# Patient Record
Sex: MALE | Race: WHITE | NOT HISPANIC OR LATINO | Employment: FULL TIME | RURAL
[De-identification: names, ages, dates, MRNs, and addresses within clinical notes are randomized per-mention and may not be internally consistent; named-entity substitution may affect disease eponyms.]

---

## 2021-04-02 RX ORDER — LISINOPRIL 10 MG/1
10 TABLET ORAL DAILY
COMMUNITY
Start: 2021-03-08 | End: 2021-04-02 | Stop reason: SDUPTHER

## 2021-04-02 RX ORDER — LISINOPRIL 10 MG/1
10 TABLET ORAL DAILY
Qty: 90 TABLET | Refills: 1 | Status: SHIPPED | OUTPATIENT
Start: 2021-04-02 | End: 2021-11-09 | Stop reason: DRUGHIGH

## 2021-04-02 RX ORDER — AMLODIPINE BESYLATE 5 MG/1
5 TABLET ORAL DAILY
Qty: 90 TABLET | Refills: 1 | Status: SHIPPED | OUTPATIENT
Start: 2021-04-02 | End: 2021-08-25

## 2021-04-02 RX ORDER — AMLODIPINE BESYLATE 5 MG/1
5 TABLET ORAL DAILY
COMMUNITY
Start: 2021-03-08 | End: 2021-04-02 | Stop reason: SDUPTHER

## 2021-04-13 ENCOUNTER — OFFICE VISIT (OUTPATIENT)
Dept: FAMILY MEDICINE | Facility: CLINIC | Age: 61
End: 2021-04-13
Payer: COMMERCIAL

## 2021-04-13 VITALS
HEART RATE: 62 BPM | RESPIRATION RATE: 20 BRPM | DIASTOLIC BLOOD PRESSURE: 84 MMHG | BODY MASS INDEX: 33 KG/M2 | OXYGEN SATURATION: 97 % | SYSTOLIC BLOOD PRESSURE: 126 MMHG | WEIGHT: 243.63 LBS | TEMPERATURE: 98 F | HEIGHT: 72 IN

## 2021-04-13 DIAGNOSIS — E78.00 HYPERCHOLESTEREMIA: ICD-10-CM

## 2021-04-13 DIAGNOSIS — M25.521 BILATERAL ELBOW JOINT PAIN: ICD-10-CM

## 2021-04-13 DIAGNOSIS — M25.522 BILATERAL ELBOW JOINT PAIN: ICD-10-CM

## 2021-04-13 DIAGNOSIS — I10 ESSENTIAL HYPERTENSION: Primary | ICD-10-CM

## 2021-04-13 DIAGNOSIS — M25.562 CHRONIC PAIN OF LEFT KNEE: ICD-10-CM

## 2021-04-13 DIAGNOSIS — G89.29 CHRONIC PAIN OF LEFT KNEE: ICD-10-CM

## 2021-04-13 DIAGNOSIS — Z78.9 DIPHTHERIA-PERTUSSIS-TETANUS (DPT) VACCINATION ADMINISTERED AT CURRENT VISIT: ICD-10-CM

## 2021-04-13 PROCEDURE — 90471 PR IMMUNIZ ADMIN,1 SINGLE/COMB VAC/TOXOID: ICD-10-PCS | Mod: ,,, | Performed by: NURSE PRACTITIONER

## 2021-04-13 PROCEDURE — 90715 PR TDAP VACCINE >7 YO, IM: ICD-10-PCS | Mod: ,,, | Performed by: NURSE PRACTITIONER

## 2021-04-13 PROCEDURE — 99214 OFFICE O/P EST MOD 30 MIN: CPT | Mod: 25,,, | Performed by: NURSE PRACTITIONER

## 2021-04-13 PROCEDURE — 90471 IMMUNIZATION ADMIN: CPT | Mod: ,,, | Performed by: NURSE PRACTITIONER

## 2021-04-13 PROCEDURE — 99214 PR OFFICE/OUTPT VISIT, EST, LEVL IV, 30-39 MIN: ICD-10-PCS | Mod: 25,,, | Performed by: NURSE PRACTITIONER

## 2021-04-13 PROCEDURE — 90715 TDAP VACCINE 7 YRS/> IM: CPT | Mod: ,,, | Performed by: NURSE PRACTITIONER

## 2021-04-13 RX ORDER — MELOXICAM 7.5 MG/1
7.5 TABLET ORAL DAILY
Qty: 90 TABLET | Refills: 1 | Status: CANCELLED | OUTPATIENT
Start: 2021-04-13

## 2021-04-13 RX ORDER — MELOXICAM 7.5 MG/1
7.5 TABLET ORAL DAILY
COMMUNITY
End: 2021-04-13 | Stop reason: SDUPTHER

## 2021-04-13 RX ORDER — METOPROLOL SUCCINATE 25 MG/1
25 TABLET, EXTENDED RELEASE ORAL DAILY
COMMUNITY
Start: 2021-04-08 | End: 2021-11-09

## 2021-04-13 RX ORDER — MELOXICAM 7.5 MG/1
7.5 TABLET ORAL DAILY
Qty: 90 TABLET | Refills: 1 | Status: SHIPPED | OUTPATIENT
Start: 2021-04-13 | End: 2021-10-12

## 2021-11-09 ENCOUNTER — OFFICE VISIT (OUTPATIENT)
Dept: FAMILY MEDICINE | Facility: CLINIC | Age: 61
End: 2021-11-09
Payer: COMMERCIAL

## 2021-11-09 VITALS
DIASTOLIC BLOOD PRESSURE: 84 MMHG | HEART RATE: 61 BPM | WEIGHT: 246 LBS | BODY MASS INDEX: 33.32 KG/M2 | SYSTOLIC BLOOD PRESSURE: 126 MMHG | RESPIRATION RATE: 20 BRPM | OXYGEN SATURATION: 96 % | HEIGHT: 72 IN | TEMPERATURE: 98 F

## 2021-11-09 DIAGNOSIS — Z11.59 NEED FOR HEPATITIS C SCREENING TEST: ICD-10-CM

## 2021-11-09 DIAGNOSIS — Z86.16 HISTORY OF COVID-19: ICD-10-CM

## 2021-11-09 DIAGNOSIS — Z79.899 ENCOUNTER FOR LONG-TERM (CURRENT) USE OF OTHER MEDICATIONS: ICD-10-CM

## 2021-11-09 DIAGNOSIS — Z13.1 DIABETES MELLITUS SCREENING: ICD-10-CM

## 2021-11-09 DIAGNOSIS — Z23 FLU VACCINE NEED: ICD-10-CM

## 2021-11-09 DIAGNOSIS — Z00.00 ROUTINE GENERAL MEDICAL EXAMINATION AT A HEALTH CARE FACILITY: Primary | ICD-10-CM

## 2021-11-09 DIAGNOSIS — Z12.5 PROSTATE CANCER SCREENING: ICD-10-CM

## 2021-11-09 DIAGNOSIS — Z13.220 SCREENING FOR HYPERLIPIDEMIA: ICD-10-CM

## 2021-11-09 LAB
CHOLEST SERPL-MCNC: 86 MG/DL (ref 0–200)
CHOLEST/HDLC SERPL: 1.9 {RATIO}
GLUCOSE SERPL-MCNC: 102 MG/DL (ref 74–106)
HCV AB SER QL: NORMAL
HDLC SERPL-MCNC: 46 MG/DL (ref 40–60)
LDLC SERPL CALC-MCNC: 26 MG/DL
LDLC/HDLC SERPL: 0.6 {RATIO}
NONHDLC SERPL-MCNC: 40 MG/DL
PSA SERPL-MCNC: 1.29 NG/ML (ref 0–4.1)
TRIGL SERPL-MCNC: 70 MG/DL (ref 35–150)
VLDLC SERPL-MCNC: 14 MG/DL

## 2021-11-09 PROCEDURE — 82947 GLUCOSE, FASTING: ICD-10-PCS | Mod: ,,, | Performed by: CLINICAL MEDICAL LABORATORY

## 2021-11-09 PROCEDURE — 86769 SARS-COV-2 SPIKE AB, SEMI-QUANT, S: ICD-10-PCS | Mod: 90,,, | Performed by: CLINICAL MEDICAL LABORATORY

## 2021-11-09 PROCEDURE — 90686 IIV4 VACC NO PRSV 0.5 ML IM: CPT | Mod: ,,, | Performed by: NURSE PRACTITIONER

## 2021-11-09 PROCEDURE — G0103 PSA SCREENING: HCPCS | Mod: ,,, | Performed by: CLINICAL MEDICAL LABORATORY

## 2021-11-09 PROCEDURE — G0103 PSA, SCREENING: ICD-10-PCS | Mod: ,,, | Performed by: CLINICAL MEDICAL LABORATORY

## 2021-11-09 PROCEDURE — 80061 LIPID PANEL: CPT | Mod: ,,, | Performed by: CLINICAL MEDICAL LABORATORY

## 2021-11-09 PROCEDURE — 80061 LIPID PANEL: ICD-10-PCS | Mod: ,,, | Performed by: CLINICAL MEDICAL LABORATORY

## 2021-11-09 PROCEDURE — 82947 ASSAY GLUCOSE BLOOD QUANT: CPT | Mod: ,,, | Performed by: CLINICAL MEDICAL LABORATORY

## 2021-11-09 PROCEDURE — 90686 FLU VACCINE (QUAD) GREATER THAN OR EQUAL TO 3YO PRESERVATIVE FREE IM: ICD-10-PCS | Mod: ,,, | Performed by: NURSE PRACTITIONER

## 2021-11-09 PROCEDURE — 3079F DIAST BP 80-89 MM HG: CPT | Mod: ,,, | Performed by: NURSE PRACTITIONER

## 2021-11-09 PROCEDURE — 86803 HEPATITIS C AB TEST: CPT | Mod: ,,, | Performed by: CLINICAL MEDICAL LABORATORY

## 2021-11-09 PROCEDURE — 99396 PREV VISIT EST AGE 40-64: CPT | Mod: 25,,, | Performed by: NURSE PRACTITIONER

## 2021-11-09 PROCEDURE — 3079F PR MOST RECENT DIASTOLIC BLOOD PRESSURE 80-89 MM HG: ICD-10-PCS | Mod: ,,, | Performed by: NURSE PRACTITIONER

## 2021-11-09 PROCEDURE — 3008F PR BODY MASS INDEX (BMI) DOCUMENTED: ICD-10-PCS | Mod: ,,, | Performed by: NURSE PRACTITIONER

## 2021-11-09 PROCEDURE — 99396 PR PREVENTIVE VISIT,EST,40-64: ICD-10-PCS | Mod: 25,,, | Performed by: NURSE PRACTITIONER

## 2021-11-09 PROCEDURE — 4010F PR ACE/ARB THEARPY RXD/TAKEN: ICD-10-PCS | Mod: ,,, | Performed by: NURSE PRACTITIONER

## 2021-11-09 PROCEDURE — 86803 HEPATITIS C ANTIBODY: ICD-10-PCS | Mod: ,,, | Performed by: CLINICAL MEDICAL LABORATORY

## 2021-11-09 PROCEDURE — 1159F PR MEDICATION LIST DOCUMENTED IN MEDICAL RECORD: ICD-10-PCS | Mod: ,,, | Performed by: NURSE PRACTITIONER

## 2021-11-09 PROCEDURE — 90471 FLU VACCINE (QUAD) GREATER THAN OR EQUAL TO 3YO PRESERVATIVE FREE IM: ICD-10-PCS | Mod: ,,, | Performed by: NURSE PRACTITIONER

## 2021-11-09 PROCEDURE — 4010F ACE/ARB THERAPY RXD/TAKEN: CPT | Mod: ,,, | Performed by: NURSE PRACTITIONER

## 2021-11-09 PROCEDURE — 3074F SYST BP LT 130 MM HG: CPT | Mod: ,,, | Performed by: NURSE PRACTITIONER

## 2021-11-09 PROCEDURE — 1159F MED LIST DOCD IN RCRD: CPT | Mod: ,,, | Performed by: NURSE PRACTITIONER

## 2021-11-09 PROCEDURE — 86769 SARS-COV-2 COVID-19 ANTIBODY: CPT | Mod: 90,,, | Performed by: CLINICAL MEDICAL LABORATORY

## 2021-11-09 PROCEDURE — 3008F BODY MASS INDEX DOCD: CPT | Mod: ,,, | Performed by: NURSE PRACTITIONER

## 2021-11-09 PROCEDURE — 3074F PR MOST RECENT SYSTOLIC BLOOD PRESSURE < 130 MM HG: ICD-10-PCS | Mod: ,,, | Performed by: NURSE PRACTITIONER

## 2021-11-09 PROCEDURE — 90471 IMMUNIZATION ADMIN: CPT | Mod: ,,, | Performed by: NURSE PRACTITIONER

## 2021-11-09 RX ORDER — ATORVASTATIN CALCIUM 40 MG/1
40 TABLET, FILM COATED ORAL DAILY
COMMUNITY

## 2021-11-09 RX ORDER — LISINOPRIL 20 MG/1
20 TABLET ORAL DAILY
COMMUNITY
End: 2022-11-15 | Stop reason: DRUGHIGH

## 2021-11-09 RX ORDER — ASPIRIN 81 MG/1
81 TABLET ORAL DAILY
COMMUNITY
End: 2022-06-16

## 2021-11-11 LAB
M SARS-COV-2 SPIKE AB, INTERP, S: POSITIVE
M SARS-COV-2 SPIKE AB, QUANT, S: >250 U/ML

## 2021-12-13 ENCOUNTER — TELEPHONE (OUTPATIENT)
Dept: FAMILY MEDICINE | Facility: CLINIC | Age: 61
End: 2021-12-13
Payer: COMMERCIAL

## 2021-12-13 ENCOUNTER — OFFICE VISIT (OUTPATIENT)
Dept: FAMILY MEDICINE | Facility: CLINIC | Age: 61
End: 2021-12-13
Payer: COMMERCIAL

## 2021-12-13 VITALS
SYSTOLIC BLOOD PRESSURE: 116 MMHG | BODY MASS INDEX: 32.56 KG/M2 | TEMPERATURE: 98 F | RESPIRATION RATE: 20 BRPM | DIASTOLIC BLOOD PRESSURE: 74 MMHG | WEIGHT: 240.38 LBS | HEART RATE: 68 BPM | HEIGHT: 72 IN | OXYGEN SATURATION: 98 %

## 2021-12-13 DIAGNOSIS — J31.0 CHRONIC RHINITIS: ICD-10-CM

## 2021-12-13 DIAGNOSIS — J06.9 UPPER RESPIRATORY TRACT INFECTION, UNSPECIFIED TYPE: Primary | ICD-10-CM

## 2021-12-13 LAB
CTP QC/QA: YES
FLUAV AG NPH QL: NEGATIVE
FLUBV AG NPH QL: NEGATIVE
SARS-COV-2 AG RESP QL IA.RAPID: NEGATIVE

## 2021-12-13 PROCEDURE — 99212 PR OFFICE/OUTPT VISIT, EST, LEVL II, 10-19 MIN: ICD-10-PCS | Mod: ,,, | Performed by: NURSE PRACTITIONER

## 2021-12-13 PROCEDURE — 4010F ACE/ARB THERAPY RXD/TAKEN: CPT | Mod: ,,, | Performed by: NURSE PRACTITIONER

## 2021-12-13 PROCEDURE — 87428 POCT SARS-COV2 (COVID) WITH FLU ANTIGEN: ICD-10-PCS | Mod: QW,,, | Performed by: NURSE PRACTITIONER

## 2021-12-13 PROCEDURE — 4010F PR ACE/ARB THEARPY RXD/TAKEN: ICD-10-PCS | Mod: ,,, | Performed by: NURSE PRACTITIONER

## 2021-12-13 PROCEDURE — 99212 OFFICE O/P EST SF 10 MIN: CPT | Mod: ,,, | Performed by: NURSE PRACTITIONER

## 2021-12-13 PROCEDURE — 87428 SARSCOV & INF VIR A&B AG IA: CPT | Mod: QW,,, | Performed by: NURSE PRACTITIONER

## 2021-12-13 RX ORDER — FLUTICASONE PROPIONATE 50 MCG
2 SPRAY, SUSPENSION (ML) NASAL DAILY
Qty: 48 G | Refills: 3 | Status: SHIPPED | OUTPATIENT
Start: 2021-12-13 | End: 2022-06-16 | Stop reason: SDUPTHER

## 2022-04-06 ENCOUNTER — PROCEDURE VISIT (OUTPATIENT)
Dept: DERMATOLOGY | Facility: CLINIC | Age: 62
End: 2022-04-06
Payer: COMMERCIAL

## 2022-04-06 VITALS
SYSTOLIC BLOOD PRESSURE: 120 MMHG | DIASTOLIC BLOOD PRESSURE: 61 MMHG | HEART RATE: 66 BPM | HEIGHT: 72 IN | BODY MASS INDEX: 32.51 KG/M2 | WEIGHT: 240 LBS | RESPIRATION RATE: 18 BRPM

## 2022-04-06 DIAGNOSIS — C44.311 BASAL CELL CARCINOMA (BCC) OF DORSUM OF NOSE: Primary | ICD-10-CM

## 2022-04-06 PROCEDURE — 99499 NO LOS: ICD-10-PCS | Mod: ,,, | Performed by: STUDENT IN AN ORGANIZED HEALTH CARE EDUCATION/TRAINING PROGRAM

## 2022-04-06 PROCEDURE — 13151 CMPLX RPR E/N/E/L 1.1-2.5 CM: CPT | Mod: 51,,, | Performed by: STUDENT IN AN ORGANIZED HEALTH CARE EDUCATION/TRAINING PROGRAM

## 2022-04-06 PROCEDURE — 17311: ICD-10-PCS | Mod: ,,, | Performed by: STUDENT IN AN ORGANIZED HEALTH CARE EDUCATION/TRAINING PROGRAM

## 2022-04-06 PROCEDURE — 99499 UNLISTED E&M SERVICE: CPT | Mod: ,,, | Performed by: STUDENT IN AN ORGANIZED HEALTH CARE EDUCATION/TRAINING PROGRAM

## 2022-04-06 PROCEDURE — 13151 PR RECMPL WND LID,NOS,EAR 1.1-2.5 CM: ICD-10-PCS | Mod: 51,,, | Performed by: STUDENT IN AN ORGANIZED HEALTH CARE EDUCATION/TRAINING PROGRAM

## 2022-04-06 PROCEDURE — 17311 MOHS 1 STAGE H/N/HF/G: CPT | Mod: ,,, | Performed by: STUDENT IN AN ORGANIZED HEALTH CARE EDUCATION/TRAINING PROGRAM

## 2022-04-06 NOTE — PROGRESS NOTES
Mohs Surgery Consult Note    Avtar Clements is a 61 y.o. male who is referred by Dr. Anaya for evaluation of a BCC on the dorsal nose.     Recurrent skin cancer: No    Preoperative Risk Factors:  Current Anticoagulants: Yes asa 81 mg last taken last week  Endocarditis / Rheumatic Fever hx: No  Immunocompromised: No  Prosthetic joint: No  Congenital heart defect: No  Prosthetic heart valve: No  Diabetic: No  Transplant: No  Pacemaker: No  Defibrillator:  No  Prior problem with local anesthesia: No  Tobacco History: No]  Clindamycin Allergy: No  Pregnant: no     Transmissible Diseases:  HIV No  Hepatitis B or C  No      Exam:  Limited skin exam is normal except for a ~ 8 mm BCC  located on the dorsal nose  .    Pathologic Findings:  Accession # [id number]  Diagnosis: BCC    Assessment and Plan:  Treatment Options : The various treatment options for skin cancer removal were reviewed with the patient in detail. These include Mohs surgery with its high cure rate, excisional surgery, destructive treatment, radiation therapy, and various topical therapies.  Given the indications and high cure rate, the patient has agreed to proceed with Mohs.  Risks and Benefits : The rationale for Mohs was explained to the patient. The risks and benefits to therapy were discussed in detail. Specifically, the risks of infection, scarring, bleeding, dehiscence, hematoma, prolonged wound healing, incomplete removal, allergy to anesthesia, nerve injury, inability to clear the tumor, and recurrence were addressed. The treatment site was clearly identified and confirmed by the patient.    Plan:  Mohs Micrographic Surgery    Indication for Mohs: Clinical area critical for tissue conservation (Area H: central face, eyelids, eyebrows, nose, lips, chin, ear, periauricular, temple, genitalia, hands, feet, ankles, nail units and areola) and Poorly-defined clinical tumor borders  Mohs AUC Score: 9   Proposed closure: Linear   Indication for  antibiotics: Clindamycin solution used in lidocaine.       Julio Salazar MD   Mohs Surgery/Dermatologic Oncology

## 2022-04-06 NOTE — PATIENT INSTRUCTIONS

## 2022-04-06 NOTE — PROGRESS NOTES
Mohs Surgery Operative Note    Patient name: Avtar Clements  Date: 04/06/2022    Mohs accession #:   Previous dermpath accession #: [id number]  Location: dorsal nose  Preop diagnosis:BCC  Postop diagnosis: Same  Mohs AUC score: 9  Number of stages: 1  Preop size: 0.7 x 0.5 cm  Postop size: 0.9 x 0.9 cm  Depth of defect: muscle  Repair type: complex repair    Surgeon and Pathologist: NEIL Salazar MD     Indications for Mohs Surgery    Removal of the patient's tumor is complicated by the following clinical features: Clinical area critical for tissue conservation (Area H: central face, eyelids, eyebrows, nose, lips, chin, ear, periauricular, temple, genitalia, hands, feet, ankles, nail units and areola)    Based on my medical judgement, Mohs surgery is the most appropriate treatment for this cancer compared to other treatments. I discussed alternative treatments to Mohs surgery and specifically discussed the risks and benefits of curettage, excision with permanent sections, and foregoing treatment. The rationale for Mohs was explained to the patient and consent was obtained. The risks, benefits and alternatives to therapy were discussed in detail. Specifically, the risks of infection, scarring, bleeding, prolonged wound healing, incomplete removal, allergy to anesthesia, nerve injury and recurrence were addressed. Prior to the procedure, the treatment site was clearly identified and confirmed by the patient. All components of Universal Protocol/PAUSE Rule completed. YUMIKO Salazar MD operated in two distinct and integrated capacities as the surgeon and pathologist.    STAGE I:  The patient was placed on the operating table. The cancer was identified and outlined. The entire surgical field was prepped with chlorhexidine The surgical site was anesthetized using Lidocaine 1% with epinephrine 1:100,000 buffered with sodium bicarbonate 8.4% in a 1:10 ratio.    The area of clinically apparent tumor was debulked  with a 2 mm curette. The layer of tissue was then surgically excised using a #15 blade and was then transferred onto a specimen sheet maintaining the orientation of the specimen. Hemostasis was obtained using monopolar electrodesiccation. The wound site was then covered with a dressing while the tissue samples were processed for examination.    The specimen was oriented, mapped and divided. Each section was then inked and processed in the Mohs lab using the Mohs protocol and submitted for frozen section. The histopathologic sections were reviewed by the surgeon in conjunction with the reference map.     Total blocks: 1 Total slides: 2    Frozen section analysis revealed: No additional tumor identified on microscopic examination by the surgeon. Histology: No malignant cells seen in the sections examined.    Additional Histologic Findings: Tumor in B slide clears in A slide     REPAIR: Complex Closure    Primary Surgeon : NEIL Salazar MD   Repair Size: 2.3 cm  Sutures:  4-0 monocryl; 5-0 prolene   Width of underminin cm   Free margin involved: no  Presence of exposed bone/cartilage/tendon/named neurovascular structure: no  Use of retention sutures: no  Indication for complex repair: Wide undermining at least the width of the defect on one side needed to facilitate a lower tension closure     The defect was identified and a marking pen was used to plan the repair. The area was infiltrated with Lidocaine 1% with epinephrine 1:100,000 buffered with sodium bicarbonate 8.4% in a 1:10 ratio, prepped with chlorhexidine and draped with sterile towels.  The wound was debeveled and undermined widely to the width listed as above. Cones were excised within relaxed skin tension lines on both sides of the defect. Hemostasis was obtained using monopolar electrodesiccation. The dermis and subcutaneous tissue were then approximated using buried vertical mattress sutures. Percutaneous simple running sutures were carefully placed  for maximum eversion and meticulous wound edge approximation. Careful attention was paid to avoid distorting any nearby free margins.    The wound was cleansed with saline and ointment was applied along the wound surface. A sterile pressure dressing was applied. Wound care instructions were given verbally and in writing. The patient left the operating suite in stable condition. Patient was informed that additional refinement of the resulting surgical scar may be used as a second stage of this reconstruction.    Julio Salazar MD   Mohs Surgery, Dermatologic Oncology

## 2022-04-13 ENCOUNTER — CLINICAL SUPPORT (OUTPATIENT)
Dept: DERMATOLOGY | Facility: CLINIC | Age: 62
End: 2022-04-13
Payer: COMMERCIAL

## 2022-04-13 DIAGNOSIS — Z48.02 ENCOUNTER FOR REMOVAL OF SUTURES: Primary | ICD-10-CM

## 2022-04-13 DIAGNOSIS — L08.9 SKIN INFECTION: ICD-10-CM

## 2022-04-13 PROCEDURE — 87077 CULTURE, WOUND: ICD-10-PCS | Mod: ,,, | Performed by: CLINICAL MEDICAL LABORATORY

## 2022-04-13 PROCEDURE — 87186 CULTURE, WOUND: ICD-10-PCS | Mod: ,,, | Performed by: CLINICAL MEDICAL LABORATORY

## 2022-04-13 PROCEDURE — 87186 SC STD MICRODIL/AGAR DIL: CPT | Mod: ,,, | Performed by: CLINICAL MEDICAL LABORATORY

## 2022-04-13 PROCEDURE — 87070 CULTURE, WOUND: ICD-10-PCS | Mod: ,,, | Performed by: CLINICAL MEDICAL LABORATORY

## 2022-04-13 PROCEDURE — 87077 CULTURE AEROBIC IDENTIFY: CPT | Mod: ,,, | Performed by: CLINICAL MEDICAL LABORATORY

## 2022-04-13 PROCEDURE — 87070 CULTURE OTHR SPECIMN AEROBIC: CPT | Mod: ,,, | Performed by: CLINICAL MEDICAL LABORATORY

## 2022-04-13 NOTE — PROGRESS NOTES
Patient name: Avtar Clements  Date: 04/06/2022     Mohs accession #:   Previous dermpath accession #: [id number]  Location: dorsal nose  Preop diagnosis:BCC  Postop diagnosis: Same  Mohs AUC score: 9  Number of stages: 1  Preop size: 0.7 x 0.5 cm  Postop size: 0.9 x 0.9 cm  Depth of defect: muscle  Repair type: complex repair      Sutures removed without complication.  Patient denies any tenderness to the area  Wound culture obtained to r/o any infection  No follow ups on file.   Nissa Becker,CMA

## 2022-04-16 ENCOUNTER — TELEPHONE (OUTPATIENT)
Dept: DERMATOLOGY | Facility: CLINIC | Age: 62
End: 2022-04-16
Payer: COMMERCIAL

## 2022-04-16 DIAGNOSIS — L08.9 SKIN INFECTION: Primary | ICD-10-CM

## 2022-04-16 LAB — MICROORGANISM SPEC CULT: ABNORMAL

## 2022-04-16 RX ORDER — GENTAMICIN SULFATE 1 MG/G
OINTMENT TOPICAL 3 TIMES DAILY
Qty: 15 G | Refills: 2 | Status: SHIPPED | OUTPATIENT
Start: 2022-04-16 | End: 2022-06-16

## 2022-06-16 ENCOUNTER — OFFICE VISIT (OUTPATIENT)
Dept: FAMILY MEDICINE | Facility: CLINIC | Age: 62
End: 2022-06-16
Payer: COMMERCIAL

## 2022-06-16 VITALS
RESPIRATION RATE: 18 BRPM | BODY MASS INDEX: 32.64 KG/M2 | WEIGHT: 241 LBS | TEMPERATURE: 98 F | HEIGHT: 72 IN | HEART RATE: 70 BPM | SYSTOLIC BLOOD PRESSURE: 114 MMHG | OXYGEN SATURATION: 98 % | DIASTOLIC BLOOD PRESSURE: 70 MMHG

## 2022-06-16 DIAGNOSIS — R05.9 COUGH: ICD-10-CM

## 2022-06-16 DIAGNOSIS — J02.9 SORE THROAT: ICD-10-CM

## 2022-06-16 DIAGNOSIS — R09.81 NASAL CONGESTION: ICD-10-CM

## 2022-06-16 DIAGNOSIS — U07.1 COVID-19: Primary | ICD-10-CM

## 2022-06-16 DIAGNOSIS — J31.0 CHRONIC RHINITIS: ICD-10-CM

## 2022-06-16 DIAGNOSIS — R50.9 FEVER, UNSPECIFIED FEVER CAUSE: ICD-10-CM

## 2022-06-16 LAB
CTP QC/QA: YES
CTP QC/QA: YES
FLUAV AG NPH QL: NEGATIVE
FLUBV AG NPH QL: NEGATIVE
S PYO RRNA THROAT QL PROBE: NEGATIVE
SARS-COV-2 AG RESP QL IA.RAPID: POSITIVE

## 2022-06-16 PROCEDURE — 1159F MED LIST DOCD IN RCRD: CPT | Mod: ,,, | Performed by: NURSE PRACTITIONER

## 2022-06-16 PROCEDURE — 1160F PR REVIEW ALL MEDS BY PRESCRIBER/CLIN PHARMACIST DOCUMENTED: ICD-10-PCS | Mod: ,,, | Performed by: NURSE PRACTITIONER

## 2022-06-16 PROCEDURE — 87880 STREP A ASSAY W/OPTIC: CPT | Mod: QW,,, | Performed by: NURSE PRACTITIONER

## 2022-06-16 PROCEDURE — 3008F PR BODY MASS INDEX (BMI) DOCUMENTED: ICD-10-PCS | Mod: ,,, | Performed by: NURSE PRACTITIONER

## 2022-06-16 PROCEDURE — 1159F PR MEDICATION LIST DOCUMENTED IN MEDICAL RECORD: ICD-10-PCS | Mod: ,,, | Performed by: NURSE PRACTITIONER

## 2022-06-16 PROCEDURE — 3008F BODY MASS INDEX DOCD: CPT | Mod: ,,, | Performed by: NURSE PRACTITIONER

## 2022-06-16 PROCEDURE — 87428 SARSCOV & INF VIR A&B AG IA: CPT | Mod: QW,,, | Performed by: NURSE PRACTITIONER

## 2022-06-16 PROCEDURE — 1160F RVW MEDS BY RX/DR IN RCRD: CPT | Mod: ,,, | Performed by: NURSE PRACTITIONER

## 2022-06-16 PROCEDURE — 3078F PR MOST RECENT DIASTOLIC BLOOD PRESSURE < 80 MM HG: ICD-10-PCS | Mod: ,,, | Performed by: NURSE PRACTITIONER

## 2022-06-16 PROCEDURE — 99213 OFFICE O/P EST LOW 20 MIN: CPT | Mod: ,,, | Performed by: NURSE PRACTITIONER

## 2022-06-16 PROCEDURE — 3078F DIAST BP <80 MM HG: CPT | Mod: ,,, | Performed by: NURSE PRACTITIONER

## 2022-06-16 PROCEDURE — 87880 POCT RAPID STREP A: ICD-10-PCS | Mod: QW,,, | Performed by: NURSE PRACTITIONER

## 2022-06-16 PROCEDURE — 4010F PR ACE/ARB THEARPY RXD/TAKEN: ICD-10-PCS | Mod: ,,, | Performed by: NURSE PRACTITIONER

## 2022-06-16 PROCEDURE — 3074F PR MOST RECENT SYSTOLIC BLOOD PRESSURE < 130 MM HG: ICD-10-PCS | Mod: ,,, | Performed by: NURSE PRACTITIONER

## 2022-06-16 PROCEDURE — 4010F ACE/ARB THERAPY RXD/TAKEN: CPT | Mod: ,,, | Performed by: NURSE PRACTITIONER

## 2022-06-16 PROCEDURE — 99213 PR OFFICE/OUTPT VISIT, EST, LEVL III, 20-29 MIN: ICD-10-PCS | Mod: ,,, | Performed by: NURSE PRACTITIONER

## 2022-06-16 PROCEDURE — 3074F SYST BP LT 130 MM HG: CPT | Mod: ,,, | Performed by: NURSE PRACTITIONER

## 2022-06-16 PROCEDURE — 87428 POCT SARS-COV2 (COVID) WITH FLU ANTIGEN: ICD-10-PCS | Mod: QW,,, | Performed by: NURSE PRACTITIONER

## 2022-06-16 RX ORDER — NIRMATRELVIR AND RITONAVIR 300-100 MG
KIT ORAL
Qty: 30 TABLET | Refills: 0 | Status: SHIPPED | OUTPATIENT
Start: 2022-06-16 | End: 2022-11-15 | Stop reason: ALTCHOICE

## 2022-06-16 RX ORDER — FLUTICASONE PROPIONATE 50 MCG
2 SPRAY, SUSPENSION (ML) NASAL DAILY
Qty: 48 G | Refills: 3 | Status: SHIPPED | OUTPATIENT
Start: 2022-06-16 | End: 2022-11-15 | Stop reason: SDUPTHER

## 2022-06-16 NOTE — PROGRESS NOTES
[facility]       PATIENT NAME: Avtar Clements   : 1960    AGE: 62 y.o. DATE: 2022    MRN: [id number]        Reason for Visit / Chief Complaint: Sore Throat, Cough, Fever, and Nasal Congestion (X 3 days)     Subjective:     Presents as a walk-in c/o Sore Throat, Cough, Fever, and Nasal Congestion (X 3 days)     2 mornings ago sore throat then developed other s/s  Home test neg yesterday    Review of Systems:     Review of Systems   Constitutional: Positive for fatigue and fever. Negative for appetite change and chills.   HENT: Positive for congestion, postnasal drip, rhinorrhea and sore throat. Negative for ear pain and sinus pressure.    Respiratory: Positive for cough. Negative for shortness of breath and wheezing.    Cardiovascular: Negative for chest pain.   Gastrointestinal: Negative for abdominal pain, diarrhea, nausea and vomiting.   Musculoskeletal: Positive for myalgias.   Skin: Negative.    Neurological: Negative for headaches.       Allergies and Medications:   Review of patient's allergies indicates:  No Known Allergies     Current Outpatient Medications on File Prior to Visit   Medication Sig Dispense Refill    amLODIPine (NORVASC) 5 MG tablet Take 1 tablet (5 mg total) by mouth once daily. 90 tablet 2    atorvastatin (LIPITOR) 40 MG tablet Take 40 mg by mouth once daily.      lisinopriL (PRINIVIL,ZESTRIL) 20 MG tablet Take 20 mg by mouth once daily.      meloxicam (MOBIC) 7.5 MG tablet Take 1 tablet (7.5 mg total) by mouth once daily. Take with food. 90 tablet 1    [DISCONTINUED] aspirin (ECOTRIN) 81 MG EC tablet Take 81 mg by mouth once daily.      [DISCONTINUED] fluticasone propionate (FLONASE) 50 mcg/actuation nasal spray 2 sprays (100 mcg total) by Each Nostril route once daily. 48 g 3    [DISCONTINUED] gentamicin (GARAMYCIN) 0.1 % ointment Apply topically 3 (three) times daily. 15 g 2     No current  facility-administered medications on file prior to visit.       Medical/Social/Family History:     Past Medical History:   Diagnosis Date    Essential hypertension     Hypercholesteremia 11/05/2020          Social History     Tobacco Use   Smoking Status Never Smoker   Smokeless Tobacco Never Used      Social History     Substance and Sexual Activity   Alcohol Use Not Currently       Family History   Problem Relation Age of Onset    Arthritis Mother     Heart disease Mother     Heart disease Father     Stroke Father     No Known Problems Sister     Heart disease Brother     Anxiety disorder Daughter     Stroke Daughter     Migraines Daughter     Anxiety disorder Son     ADD / ADHD Son     No Known Problems Maternal Grandmother     No Known Problems Maternal Grandfather     No Known Problems Paternal Grandmother     No Known Problems Paternal Grandfather     No Known Problems Sister     No Known Problems Sister     Down syndrome Sister     Anxiety disorder Daughter       Past Surgical History:   Procedure Laterality Date    TONSILLECTOMY        Immunization History   Administered Date(s) Administered    COVID-19, vector-nr, rS-Ad26, PF ([employer]) 05/03/2021    Influenza 09/15/2020    Influenza - Quadrivalent - PF *Preferred* (6 months and older) 11/09/2021    Tdap 04/13/2021    Zoster 10/07/2021          Objective:     Wt Readings from Last 3 Encounters:   06/16/22 1001 109.3 kg (241 lb)   04/06/22 1330 108.9 kg (240 lb)   12/13/21 1145 109 kg (240 lb 6.4 oz)       Vitals:    06/16/22 1001   BP: 114/70   BP Location: Right arm   Patient Position: Sitting   BP Method: Small (Manual)   Pulse: 70   Resp: 18   Temp: 98.1 °F (36.7 °C)   SpO2: 98%   Weight: 109.3 kg (241 lb)   Height: 6' (1.829 m)     Body mass index is 32.69 kg/m².     Physical Exam:    Physical Exam  Vitals and nursing note reviewed.   Constitutional:       Appearance: Normal appearance.   HENT:      Head: Normocephalic.       Right Ear: Tympanic membrane, ear canal and external ear normal.      Left Ear: Tympanic membrane and ear canal normal.      Nose: Mucosal edema present. No congestion or rhinorrhea.      Mouth/Throat:      Mouth: Mucous membranes are moist.      Pharynx: Oropharynx is clear.   Eyes:      Conjunctiva/sclera: Conjunctivae normal.   Neck:      Thyroid: No thyromegaly or thyroid tenderness.      Trachea: Trachea normal.   Cardiovascular:      Rate and Rhythm: Normal rate and regular rhythm.      Pulses: Normal pulses.      Heart sounds: Normal heart sounds.   Pulmonary:      Effort: Pulmonary effort is normal.      Breath sounds: Normal breath sounds.   Musculoskeletal:      Cervical back: Neck supple.   Lymphadenopathy:      Cervical: No cervical adenopathy.   Skin:     General: Skin is warm and dry.   Neurological:      Mental Status: He is alert and oriented to person, place, and time.   Psychiatric:         Mood and Affect: Mood normal.         Behavior: Behavior normal.         Assessment:          ICD-10-CM ICD-9-CM   1. COVID-19  U07.1 079.89   2. Sore throat  J02.9 462   3. Cough  R05.9 786.2   4. Fever, unspecified fever cause  R50.9 780.60   5. Nasal congestion  R09.81 478.19   6. Chronic rhinitis  J31.0 472.0        Plan:     Rapid strep negative      Rapid COVID POSITIVE and flu neg      Advised to quarantine per updated CDC guidelines.  Advised COVID-19 is a virus and viruses do not respond to antibiotics nor do antibiotics prevent a bacterial infection from developing.   Advised steroids are not recommended for outpatient management of covid symptoms except in certain extreme or special circumstances for patients who require hospitalization or supplemental oxygen.     Advised of EAU anti-viral within first 5 days of illness and wishes to proceed. Advised to hold lipitor while taking Paxlovid.  Treatment for COVID-19 is primarily supportive including rest, increase fluids, and the use of OTC meds to  help alleviate symptoms.    For immune support take vitamin C, vitamin D, and zinc 50 mg daily.  Acetaminophen or ibuprofen as needed for body aches, headache, or fever.  Home O2 sat monitoring can be used if experiencing shortness of breath.   Advised to report to ED for evaluation of any new or worsening chest pain, shortness of breath, or other severe symptoms.  Call the office if you have other questions or concerns that arise.      COVID-19  -     nirmatrelvir-ritonavir (PAXLOVID, EUA,) 150 mg x 2- 100 mg copackaged tablets (EUA); Take 3 tablets by mouth 2 (two) times daily. Each dose contains 2 nirmatrelvir (pink tablets) and 1 ritonavir (white tablet). Take all 3 tablets together 2 times daily x 5 days.  Dispense: 30 tablet; Refill: 0    Sore throat  -     POCT SARS-COV2 (COVID) with Flu Antigen  -     POCT rapid strep A    Cough  -     POCT SARS-COV2 (COVID) with Flu Antigen    Fever, unspecified fever cause  -     POCT SARS-COV2 (COVID) with Flu Antigen    Nasal congestion  -     POCT SARS-COV2 (COVID) with Flu Antigen    Chronic rhinitis  -     fluticasone propionate (FLONASE) 50 mcg/actuation nasal spray; 2 sprays (100 mcg total) by Each Nostril route once daily.  Dispense: 48 g; Refill: 3        Current Outpatient Medications:     amLODIPine (NORVASC) 5 MG tablet, Take 1 tablet (5 mg total) by mouth once daily., Disp: 90 tablet, Rfl: 2    atorvastatin (LIPITOR) 40 MG tablet, Take 40 mg by mouth once daily., Disp: , Rfl:     lisinopriL (PRINIVIL,ZESTRIL) 20 MG tablet, Take 20 mg by mouth once daily., Disp: , Rfl:     meloxicam (MOBIC) 7.5 MG tablet, Take 1 tablet (7.5 mg total) by mouth once daily. Take with food., Disp: 90 tablet, Rfl: 1    fluticasone propionate (FLONASE) 50 mcg/actuation nasal spray, 2 sprays (100 mcg total) by Each Nostril route once daily., Disp: 48 g, Rfl: 3    nirmatrelvir-ritonavir (PAXLOVID, EUA,) 150 mg x 2- 100 mg copackaged tablets (EUA), Take 3 tablets by mouth 2 (two)  times daily. Each dose contains 2 nirmatrelvir (pink tablets) and 1 ritonavir (white tablet). Take all 3 tablets together 2 times daily x 5 days., Disp: 30 tablet, Rfl: 0    Requested Prescriptions     Signed Prescriptions Disp Refills    fluticasone propionate (FLONASE) 50 mcg/actuation nasal spray 48 g 3     Si sprays (100 mcg total) by Each Nostril route once daily.    nirmatrelvir-ritonavir (PAXLOVID, EUA,) 150 mg x 2- 100 mg copackaged tablets (EUA) 30 tablet 0     Sig: Take 3 tablets by mouth 2 (two) times daily. Each dose contains 2 nirmatrelvir (pink tablets) and 1 ritonavir (white tablet). Take all 3 tablets together 2 times daily x 5 days.       F/u as needed or if symptoms worsen or persist.  Future Appointments   Date Time Provider Department Center   11/15/2022  7:40 AM YUE [facility] ENRIQUE Beth       Signature: Colleen TURNER-BC

## 2022-11-15 ENCOUNTER — OFFICE VISIT (OUTPATIENT)
Dept: FAMILY MEDICINE | Facility: CLINIC | Age: 62
End: 2022-11-15
Payer: COMMERCIAL

## 2022-11-15 VITALS
RESPIRATION RATE: 18 BRPM | SYSTOLIC BLOOD PRESSURE: 126 MMHG | HEART RATE: 60 BPM | HEIGHT: 72 IN | DIASTOLIC BLOOD PRESSURE: 72 MMHG | BODY MASS INDEX: 32.78 KG/M2 | TEMPERATURE: 99 F | OXYGEN SATURATION: 99 % | WEIGHT: 242 LBS

## 2022-11-15 DIAGNOSIS — M25.562 CHRONIC PAIN OF LEFT KNEE: Chronic | ICD-10-CM

## 2022-11-15 DIAGNOSIS — Z13.1 DIABETES MELLITUS SCREENING: ICD-10-CM

## 2022-11-15 DIAGNOSIS — Z13.220 SCREENING FOR HYPERLIPIDEMIA: ICD-10-CM

## 2022-11-15 DIAGNOSIS — G89.29 CHRONIC PAIN OF LEFT KNEE: Chronic | ICD-10-CM

## 2022-11-15 DIAGNOSIS — E66.9 OBESITY, CLASS I, BMI 30-34.9: Chronic | ICD-10-CM

## 2022-11-15 DIAGNOSIS — Z12.5 PROSTATE CANCER SCREENING: ICD-10-CM

## 2022-11-15 DIAGNOSIS — E78.00 HYPERCHOLESTEREMIA: Chronic | ICD-10-CM

## 2022-11-15 DIAGNOSIS — G47.33 OBSTRUCTIVE SLEEP APNEA SYNDROME: Chronic | ICD-10-CM

## 2022-11-15 DIAGNOSIS — I10 ESSENTIAL HYPERTENSION: Chronic | ICD-10-CM

## 2022-11-15 DIAGNOSIS — Z79.899 ENCOUNTER FOR LONG-TERM (CURRENT) USE OF OTHER MEDICATIONS: ICD-10-CM

## 2022-11-15 DIAGNOSIS — J31.0 CHRONIC RHINITIS: Chronic | ICD-10-CM

## 2022-11-15 DIAGNOSIS — Z00.00 ROUTINE GENERAL MEDICAL EXAMINATION AT A HEALTH CARE FACILITY: Primary | ICD-10-CM

## 2022-11-15 PROBLEM — U07.1 COVID-19: Status: RESOLVED | Noted: 2022-06-16 | Resolved: 2022-11-15

## 2022-11-15 PROBLEM — E66.811 CLASS 1 OBESITY: Status: ACTIVE | Noted: 2022-11-15

## 2022-11-15 LAB
ALBUMIN SERPL BCP-MCNC: 3.7 G/DL (ref 3.5–5)
ALBUMIN/GLOB SERPL: 1.1 {RATIO}
ALP SERPL-CCNC: 61 U/L (ref 45–115)
ALT SERPL W P-5'-P-CCNC: 35 U/L (ref 16–61)
ANION GAP SERPL CALCULATED.3IONS-SCNC: 9 MMOL/L (ref 7–16)
AST SERPL W P-5'-P-CCNC: 14 U/L (ref 15–37)
BILIRUB SERPL-MCNC: 0.6 MG/DL (ref ?–1.2)
BUN SERPL-MCNC: 16 MG/DL (ref 7–18)
BUN/CREAT SERPL: 17 (ref 6–20)
CALCIUM SERPL-MCNC: 8.6 MG/DL (ref 8.5–10.1)
CHLORIDE SERPL-SCNC: 107 MMOL/L (ref 98–107)
CHOLEST SERPL-MCNC: 101 MG/DL (ref 0–200)
CHOLEST/HDLC SERPL: 2.1 {RATIO}
CO2 SERPL-SCNC: 26 MMOL/L (ref 21–32)
CREAT SERPL-MCNC: 0.92 MG/DL (ref 0.7–1.3)
EGFR (NO RACE VARIABLE) (RUSH/TITUS): 93 ML/MIN/1.73M²
GLOBULIN SER-MCNC: 3.3 G/DL (ref 2–4)
GLUCOSE SERPL-MCNC: 104 MG/DL (ref 74–106)
HDLC SERPL-MCNC: 48 MG/DL (ref 40–60)
LDLC SERPL CALC-MCNC: 35 MG/DL
NONHDLC SERPL-MCNC: 53 MG/DL
POTASSIUM SERPL-SCNC: 4 MMOL/L (ref 3.5–5.1)
PROT SERPL-MCNC: 7 G/DL (ref 6.4–8.2)
PSA SERPL-MCNC: 1.17 NG/ML
SODIUM SERPL-SCNC: 138 MMOL/L (ref 136–145)
TRIGL SERPL-MCNC: 89 MG/DL (ref 35–150)
VLDLC SERPL-MCNC: 18 MG/DL

## 2022-11-15 PROCEDURE — 3074F PR MOST RECENT SYSTOLIC BLOOD PRESSURE < 130 MM HG: ICD-10-PCS | Mod: ,,, | Performed by: NURSE PRACTITIONER

## 2022-11-15 PROCEDURE — 1159F MED LIST DOCD IN RCRD: CPT | Mod: ,,, | Performed by: NURSE PRACTITIONER

## 2022-11-15 PROCEDURE — 80053 COMPREHENSIVE METABOLIC PANEL: ICD-10-PCS | Mod: ,,, | Performed by: CLINICAL MEDICAL LABORATORY

## 2022-11-15 PROCEDURE — 80053 COMPREHEN METABOLIC PANEL: CPT | Mod: ,,, | Performed by: CLINICAL MEDICAL LABORATORY

## 2022-11-15 PROCEDURE — 3078F DIAST BP <80 MM HG: CPT | Mod: ,,, | Performed by: NURSE PRACTITIONER

## 2022-11-15 PROCEDURE — G0103 PSA SCREENING: HCPCS | Mod: ,,, | Performed by: CLINICAL MEDICAL LABORATORY

## 2022-11-15 PROCEDURE — 3008F BODY MASS INDEX DOCD: CPT | Mod: ,,, | Performed by: NURSE PRACTITIONER

## 2022-11-15 PROCEDURE — 1160F PR REVIEW ALL MEDS BY PRESCRIBER/CLIN PHARMACIST DOCUMENTED: ICD-10-PCS | Mod: ,,, | Performed by: NURSE PRACTITIONER

## 2022-11-15 PROCEDURE — 80061 LIPID PANEL: CPT | Mod: ,,, | Performed by: CLINICAL MEDICAL LABORATORY

## 2022-11-15 PROCEDURE — G0103 PSA, SCREENING: ICD-10-PCS | Mod: ,,, | Performed by: CLINICAL MEDICAL LABORATORY

## 2022-11-15 PROCEDURE — 3074F SYST BP LT 130 MM HG: CPT | Mod: ,,, | Performed by: NURSE PRACTITIONER

## 2022-11-15 PROCEDURE — 3008F PR BODY MASS INDEX (BMI) DOCUMENTED: ICD-10-PCS | Mod: ,,, | Performed by: NURSE PRACTITIONER

## 2022-11-15 PROCEDURE — 80061 LIPID PANEL: ICD-10-PCS | Mod: ,,, | Performed by: CLINICAL MEDICAL LABORATORY

## 2022-11-15 PROCEDURE — 3078F PR MOST RECENT DIASTOLIC BLOOD PRESSURE < 80 MM HG: ICD-10-PCS | Mod: ,,, | Performed by: NURSE PRACTITIONER

## 2022-11-15 PROCEDURE — 4010F PR ACE/ARB THEARPY RXD/TAKEN: ICD-10-PCS | Mod: ,,, | Performed by: NURSE PRACTITIONER

## 2022-11-15 PROCEDURE — 1160F RVW MEDS BY RX/DR IN RCRD: CPT | Mod: ,,, | Performed by: NURSE PRACTITIONER

## 2022-11-15 PROCEDURE — 99396 PREV VISIT EST AGE 40-64: CPT | Mod: ,,, | Performed by: NURSE PRACTITIONER

## 2022-11-15 PROCEDURE — 1159F PR MEDICATION LIST DOCUMENTED IN MEDICAL RECORD: ICD-10-PCS | Mod: ,,, | Performed by: NURSE PRACTITIONER

## 2022-11-15 PROCEDURE — 99396 PR PREVENTIVE VISIT,EST,40-64: ICD-10-PCS | Mod: ,,, | Performed by: NURSE PRACTITIONER

## 2022-11-15 PROCEDURE — 4010F ACE/ARB THERAPY RXD/TAKEN: CPT | Mod: ,,, | Performed by: NURSE PRACTITIONER

## 2022-11-15 RX ORDER — LISINOPRIL 40 MG/1
40 TABLET ORAL DAILY
COMMUNITY
Start: 2022-09-01

## 2022-11-15 RX ORDER — FLUTICASONE PROPIONATE 50 MCG
2 SPRAY, SUSPENSION (ML) NASAL DAILY
Qty: 48 G | Refills: 3 | Status: SHIPPED | OUTPATIENT
Start: 2022-11-15 | End: 2023-11-27 | Stop reason: SDUPTHER

## 2022-11-15 NOTE — PROGRESS NOTES
[facility]       PATIENT NAME: Avtar Clements   : 1960    AGE: 62 y.o. DATE OF ENCOUNTER: 11/15/22    MRN: [id number]      Subjective     Patient ID: Avtar Clements is a 62 y.o. male.    Chief Complaint: Annual Exam (Patient presents for a healthy you visit. Patient is fasting.)    HPI  Healthy You, no CMH; will bring wellness form by for completion    Yearly f/u with Cardiology, Dr. Chiang for HTN & HLD, on statin, was increased to 40 mg lisinopril & BP has improved.  Hx chronic headaches (neg CT ), much better, just occasional now.  Saw Ortho Dr. Johnson for chronic knee pain, TKR suggested but wants to hold off.    Review of Systems   Constitutional: Negative.    HENT:  Positive for nasal congestion (chronitis). Negative for ear pain and sore throat.    Respiratory: Negative.     Cardiovascular: Negative.    Gastrointestinal: Negative.    Genitourinary: Negative.    Musculoskeletal:  Positive for arthralgias.   Neurological: Negative.    Psychiatric/Behavioral: Negative.       Tobacco Use: Low Risk     Smoking Tobacco Use: Never    Smokeless Tobacco Use: Never    Passive Exposure: Not on file     Review of patient's allergies indicates:  No Known Allergies  Current Outpatient Medications   Medication Instructions    amLODIPine (NORVASC) 5 mg, Oral, Daily    atorvastatin (LIPITOR) 40 mg, Oral, Daily    fluticasone propionate (FLONASE) 100 mcg, Each Nostril, Daily, As needed    lisinopriL (PRINIVIL,ZESTRIL) 40 mg, Oral, Daily    meloxicam (MOBIC) 7.5 mg, Oral, Daily, Take with food.     Medications Discontinued During This Encounter   Medication Reason    nirmatrelvir-ritonavir (PAXLOVID, EUA,) 150 mg x 2- 100 mg copackaged tablets (EUA) Therapy completed    fluticasone propionate (FLONASE) 50 mcg/actuation nasal spray Reorder    lisinopriL (PRINIVIL,ZESTRIL) 20 MG tablet Dose adjustment       Past Medical History:   Diagnosis Date    COVID-19 2022     Essential hypertension     Hypercholesteremia 11/05/2020         Health Maintenance Topics with due status: Not Due       Topic Last Completion Date    Colorectal Cancer Screening 08/10/2015    TETANUS VACCINE 04/13/2021    Lipid Panel 11/09/2021     Immunization History   Administered Date(s) Administered    COVID-19, vector-nr, rS-Ad26, PF ([employer]) 05/03/2021    Influenza 09/15/2020    Influenza - Quadrivalent - PF *Preferred* (6 months and older) 11/09/2021    Tdap 04/13/2021    Zoster 10/07/2021       Objective     Body mass index is 32.82 kg/m².  Wt Readings from Last 3 Encounters:   11/15/22 109.8 kg (242 lb)   06/16/22 109.3 kg (241 lb)   04/06/22 108.9 kg (240 lb)     Ht Readings from Last 3 Encounters:   11/15/22 6' (1.829 m)   06/16/22 6' (1.829 m)   04/06/22 6' (1.829 m)     BP Readings from Last 3 Encounters:   11/15/22 126/72   06/16/22 114/70   04/06/22 120/61     Temp Readings from Last 3 Encounters:   11/15/22 98.5 °F (36.9 °C) (Oral)   06/16/22 98.1 °F (36.7 °C)   12/13/21 98.2 °F (36.8 °C) (Temporal)     Pulse Readings from Last 3 Encounters:   11/15/22 60   06/16/22 70   04/06/22 66     Resp Readings from Last 3 Encounters:   11/15/22 18   06/16/22 18   04/06/22 18     PF Readings from Last 3 Encounters:   No data found for PF       Physical Exam  Vitals and nursing note reviewed.   Constitutional:       General: He is not in acute distress.     Appearance: Normal appearance.   HENT:      Head: Normocephalic.      Right Ear: Tympanic membrane, ear canal and external ear normal.      Left Ear: Tympanic membrane, ear canal and external ear normal.      Nose: Nose normal.      Mouth/Throat:      Mouth: Mucous membranes are moist.      Pharynx: Oropharynx is clear.   Eyes:      Conjunctiva/sclera: Conjunctivae normal.      Pupils: Pupils are equal, round, and reactive to light.   Neck:      Thyroid: No thyromegaly.      Vascular: Normal carotid pulses. No carotid bruit.   Cardiovascular:       Rate and Rhythm: Normal rate and regular rhythm.      Pulses: Normal pulses.      Heart sounds: Normal heart sounds.   Pulmonary:      Effort: Pulmonary effort is normal. No respiratory distress.      Breath sounds: Normal breath sounds.   Abdominal:      Palpations: Abdomen is soft.      Tenderness: There is no abdominal tenderness.   Musculoskeletal:      Cervical back: Neck supple.      Right lower leg: No edema.      Left lower leg: No edema.   Lymphadenopathy:      Cervical: No cervical adenopathy.   Skin:     General: Skin is warm and dry.   Neurological:      General: No focal deficit present.      Mental Status: He is alert and oriented to person, place, and time.   Psychiatric:         Mood and Affect: Mood normal.         Behavior: Behavior normal.       Assessment and Plan     Problem List Items Addressed This Visit          ENT    Chronic rhinitis (Chronic)    Relevant Medications    fluticasone propionate (FLONASE) 50 mcg/actuation nasal spray       Cardiac/Vascular    Essential hypertension (Chronic)    Hypercholesteremia (Chronic)    Relevant Orders    Comprehensive Metabolic Panel       Endocrine    Obesity, Class I, BMI 30-34.9 (Chronic)       Orthopedic    Chronic pain of left knee (Chronic)       Other    Obstructive sleep apnea syndrome (Chronic)     Other Visit Diagnoses       Routine general medical examination at a health care facility    -  Primary    Screening for hyperlipidemia        Relevant Orders    Lipid Panel    Diabetes mellitus screening        Relevant Orders    Comprehensive Metabolic Panel    Prostate cancer screening        Relevant Orders    PSA, Screening    Encounter for long-term (current) use of other medications        Relevant Orders    Comprehensive Metabolic Panel            Plan:   Screening lipids + other needed labs.  SANDRA for Shingrix dates from [employer] & 2022 flu shot date from [employer].        I have reviewed the medications, allergies, and problem list.     Goal  Actions:    What type of visit is the patient here for today?: Healthy You  Does the patient consent to enroll in [facility] Healthy?: No (n/a)  Is this a Wellness Follow Up?: No  What is your overall wellness goal? (select at least one): Improve overall health, Lose weight  Choose 3: Biometric, Lifestyle, Nutrition, Exercise  Biometric Actions: BMI>30 lose 1-2 lbs per week, SBP<120 and DBP<80  Lifestyle Actions : Take medications as prescribed  Nurtrition Actions: Avoid adding table salt, DASH diet  Exercise Actions: Recommend physical activity 30 minutes per day 3-5 times/week    Yearly wellness w/ fasting labs & f/u as needed.    Colleen TURNER

## 2023-03-06 ENCOUNTER — OFFICE VISIT (OUTPATIENT)
Dept: FAMILY MEDICINE | Facility: CLINIC | Age: 63
End: 2023-03-06
Payer: COMMERCIAL

## 2023-03-06 VITALS
BODY MASS INDEX: 34.08 KG/M2 | HEIGHT: 72 IN | WEIGHT: 251.63 LBS | HEART RATE: 60 BPM | SYSTOLIC BLOOD PRESSURE: 116 MMHG | OXYGEN SATURATION: 97 % | RESPIRATION RATE: 18 BRPM | DIASTOLIC BLOOD PRESSURE: 60 MMHG | TEMPERATURE: 98 F

## 2023-03-06 DIAGNOSIS — J31.0 CHRONIC RHINITIS: Chronic | ICD-10-CM

## 2023-03-06 DIAGNOSIS — J01.90 ACUTE NON-RECURRENT SINUSITIS, UNSPECIFIED LOCATION: Primary | ICD-10-CM

## 2023-03-06 PROBLEM — M25.521 BILATERAL ELBOW JOINT PAIN: Status: RESOLVED | Noted: 2021-04-13 | Resolved: 2023-03-06

## 2023-03-06 PROBLEM — M25.522 BILATERAL ELBOW JOINT PAIN: Status: RESOLVED | Noted: 2021-04-13 | Resolved: 2023-03-06

## 2023-03-06 PROCEDURE — 3008F PR BODY MASS INDEX (BMI) DOCUMENTED: ICD-10-PCS | Mod: ,,, | Performed by: NURSE PRACTITIONER

## 2023-03-06 PROCEDURE — 4010F PR ACE/ARB THEARPY RXD/TAKEN: ICD-10-PCS | Mod: ,,, | Performed by: NURSE PRACTITIONER

## 2023-03-06 PROCEDURE — 99213 PR OFFICE/OUTPT VISIT, EST, LEVL III, 20-29 MIN: ICD-10-PCS | Mod: 25,,, | Performed by: NURSE PRACTITIONER

## 2023-03-06 PROCEDURE — 1160F PR REVIEW ALL MEDS BY PRESCRIBER/CLIN PHARMACIST DOCUMENTED: ICD-10-PCS | Mod: ,,, | Performed by: NURSE PRACTITIONER

## 2023-03-06 PROCEDURE — 96372 THER/PROPH/DIAG INJ SC/IM: CPT | Mod: JZ,,, | Performed by: NURSE PRACTITIONER

## 2023-03-06 PROCEDURE — 3078F PR MOST RECENT DIASTOLIC BLOOD PRESSURE < 80 MM HG: ICD-10-PCS | Mod: ,,, | Performed by: NURSE PRACTITIONER

## 2023-03-06 PROCEDURE — 4010F ACE/ARB THERAPY RXD/TAKEN: CPT | Mod: ,,, | Performed by: NURSE PRACTITIONER

## 2023-03-06 PROCEDURE — 1159F MED LIST DOCD IN RCRD: CPT | Mod: ,,, | Performed by: NURSE PRACTITIONER

## 2023-03-06 PROCEDURE — 1160F RVW MEDS BY RX/DR IN RCRD: CPT | Mod: ,,, | Performed by: NURSE PRACTITIONER

## 2023-03-06 PROCEDURE — 1159F PR MEDICATION LIST DOCUMENTED IN MEDICAL RECORD: ICD-10-PCS | Mod: ,,, | Performed by: NURSE PRACTITIONER

## 2023-03-06 PROCEDURE — 99213 OFFICE O/P EST LOW 20 MIN: CPT | Mod: 25,,, | Performed by: NURSE PRACTITIONER

## 2023-03-06 PROCEDURE — 3074F PR MOST RECENT SYSTOLIC BLOOD PRESSURE < 130 MM HG: ICD-10-PCS | Mod: ,,, | Performed by: NURSE PRACTITIONER

## 2023-03-06 PROCEDURE — 96372 PR INJECTION,THERAP/PROPH/DIAG2ST, IM OR SUBCUT: ICD-10-PCS | Mod: JZ,,, | Performed by: NURSE PRACTITIONER

## 2023-03-06 PROCEDURE — 3008F BODY MASS INDEX DOCD: CPT | Mod: ,,, | Performed by: NURSE PRACTITIONER

## 2023-03-06 PROCEDURE — 3074F SYST BP LT 130 MM HG: CPT | Mod: ,,, | Performed by: NURSE PRACTITIONER

## 2023-03-06 PROCEDURE — 3078F DIAST BP <80 MM HG: CPT | Mod: ,,, | Performed by: NURSE PRACTITIONER

## 2023-03-06 RX ORDER — NEBIVOLOL 5 MG/1
10 TABLET ORAL DAILY
COMMUNITY
End: 2023-11-27

## 2023-03-06 RX ORDER — AZITHROMYCIN 250 MG/1
TABLET, FILM COATED ORAL
Qty: 6 TABLET | Refills: 0 | Status: SHIPPED | OUTPATIENT
Start: 2023-03-06 | End: 2023-11-27 | Stop reason: ALTCHOICE

## 2023-03-06 RX ORDER — DEXAMETHASONE SODIUM PHOSPHATE 4 MG/ML
6 INJECTION, SOLUTION INTRA-ARTICULAR; INTRALESIONAL; INTRAMUSCULAR; INTRAVENOUS; SOFT TISSUE
Status: COMPLETED | OUTPATIENT
Start: 2023-03-06 | End: 2023-03-06

## 2023-03-06 RX ADMIN — DEXAMETHASONE SODIUM PHOSPHATE 6 MG: 4 INJECTION, SOLUTION INTRA-ARTICULAR; INTRALESIONAL; INTRAMUSCULAR; INTRAVENOUS; SOFT TISSUE at 09:03

## 2023-03-06 NOTE — PROGRESS NOTES
[facility] - FAMILY MEDICINE       PATIENT NAME: Avtar Clements   : 1960    AGE: 62 y.o. DATE OF ENCOUNTER: 3/6/23   MRN: [id number]      Visit type: WALK-IN  PCP:  YUE Hayward    Reason for Visit / Chief Complaint: URI ((Coughing up mucus, nasal congestion) x7 days.)     Subjective:     Presents c/o started 1 wk ago with allergy s/s then developed sore throat that resolved and cough which is now productive of thick green sputum.  Both eyes were draining yesterday.  Taking OTC meds (dayquil/nyquil) but s/s have progressed; using flonase prn.    Review of Systems:     Review of Systems   Constitutional: Negative.    HENT:  Positive for congestion, postnasal drip, rhinorrhea, sinus pressure and sneezing. Negative for ear pain and sore throat.    Eyes:  Positive for discharge, redness and itching.   Respiratory:  Positive for cough. Negative for shortness of breath and wheezing.    Cardiovascular: Negative.    Gastrointestinal: Negative.    Musculoskeletal: Negative.    Skin: Negative.    Neurological:  Negative for headaches.     Allergies and Meds:   Review of patient's allergies indicates:  No Known Allergies     Current Outpatient Medications:     amLODIPine (NORVASC) 5 MG tablet, Take 1 tablet (5 mg total) by mouth once daily., Disp: 90 tablet, Rfl: 2    atorvastatin (LIPITOR) 40 MG tablet, Take 40 mg by mouth once daily., Disp: , Rfl:     fluticasone propionate (FLONASE) 50 mcg/actuation nasal spray, 2 sprays (100 mcg total) by Each Nostril route once daily. As needed, Disp: 48 g, Rfl: 3    lisinopriL (PRINIVIL,ZESTRIL) 40 MG tablet, Take 40 mg by mouth once daily., Disp: , Rfl:     meloxicam (MOBIC) 7.5 MG tablet, TAKE 1 TABLET (7.5 MG TOTAL) BY MOUTH ONCE DAILY. TAKE WITH FOOD., Disp: 90 tablet, Rfl: 1    nebivoloL (BYSTOLIC) 5 MG Tab, Take 10 mg by mouth once daily., Disp: , Rfl:     azithromycin (Z-KENDRA) 250 MG tablet, Take 2 tablets by mouth on day 1; Take 1 tablet by mouth on  days 2-5, Disp: 6 tablet, Rfl: 0  No current facility-administered medications for this visit.    Medical History:     Past Medical History:   Diagnosis Date    COVID-19 6/16/2022    Essential hypertension     Hypercholesteremia 11/05/2020          Social History     Tobacco Use   Smoking Status Never   Smokeless Tobacco Never      Past Surgical History:   Procedure Laterality Date    TONSILLECTOMY        Immunization History   Administered Date(s) Administered    COVID-19, vector-nr, rS-Ad26, PF (Mickie) 05/03/2021    Influenza 09/15/2020    Influenza - Quadrivalent - PF *Preferred* (6 months and older) 11/09/2021    Tdap 04/13/2021    Zoster 10/07/2021        Objective:     Wt Readings from Last 3 Encounters:   03/06/23 0838 114.1 kg (251 lb 9.6 oz)   11/15/22 0737 109.8 kg (242 lb)   06/16/22 1001 109.3 kg (241 lb)       /60 (BP Location: Left arm, Patient Position: Sitting, BP Method: Medium (Manual))   Pulse 60   Temp 97.9 °F (36.6 °C) (Oral)   Resp 18   Ht 6' (1.829 m)   Wt 114.1 kg (251 lb 9.6 oz)   SpO2 97%   BMI 34.12 kg/m²   Body mass index is 34.12 kg/m².     Physical Exam:    Physical Exam  Vitals and nursing note reviewed.   Constitutional:       General: He is not in acute distress.     Appearance: Normal appearance.   HENT:      Head: Normocephalic.      Right Ear: Tympanic membrane, ear canal and external ear normal.      Left Ear: Tympanic membrane, ear canal and external ear normal.      Nose: Mucosal edema present. No rhinorrhea.      Right Sinus: No maxillary sinus tenderness or frontal sinus tenderness.      Left Sinus: No maxillary sinus tenderness or frontal sinus tenderness.      Mouth/Throat:      Mouth: Mucous membranes are moist.      Pharynx: Uvula midline. No posterior oropharyngeal erythema or uvula swelling.   Eyes:      General:         Right eye: No discharge.         Left eye: No discharge.      Conjunctiva/sclera: Conjunctivae normal.      Pupils: Pupils are  equal, round, and reactive to light.   Neck:      Thyroid: No thyromegaly or thyroid tenderness.      Trachea: Trachea normal.   Cardiovascular:      Rate and Rhythm: Normal rate and regular rhythm.      Pulses: Normal pulses.      Heart sounds: Normal heart sounds.   Pulmonary:      Effort: Pulmonary effort is normal. No respiratory distress.      Breath sounds: Normal breath sounds.   Musculoskeletal:      Cervical back: Neck supple.   Lymphadenopathy:      Cervical: No cervical adenopathy.   Skin:     General: Skin is warm and dry.   Neurological:      Mental Status: He is alert and oriented to person, place, and time.       Assessment and Plan:        1. Acute non-recurrent sinusitis, unspecified location  -     azithromycin (Z-KENDRA) 250 MG tablet; Take 2 tablets by mouth on day 1; Take 1 tablet by mouth on days 2-5  Dispense: 6 tablet; Refill: 0  -     dexAMETHasone injection 6 mg    2. Chronic rhinitis  Comments:  controlled, continue flonase daily       F/u as needed or if symptoms worsen or persist.  Future Appointments   Date Time Provider Department Center   11/16/2023 10:40 AM YUE Hayward [facility] ENRIQUE Beth       Signature: Colleen TURNER-BC

## 2023-03-07 ENCOUNTER — PATIENT OUTREACH (OUTPATIENT)
Dept: ADMINISTRATIVE | Facility: HOSPITAL | Age: 63
End: 2023-03-07

## 2023-03-08 NOTE — PROGRESS NOTES
Population Health review of encounter with date of service 03/06/2023 with NEIL Maldonado.  Does not qualify for HY or [facility].  Has HY scheduled for 11/16/2023.

## 2023-11-27 ENCOUNTER — OFFICE VISIT (OUTPATIENT)
Dept: FAMILY MEDICINE | Facility: CLINIC | Age: 63
End: 2023-11-27
Payer: COMMERCIAL

## 2023-11-27 VITALS
BODY MASS INDEX: 34.9 KG/M2 | HEIGHT: 72 IN | TEMPERATURE: 98 F | SYSTOLIC BLOOD PRESSURE: 115 MMHG | WEIGHT: 257.63 LBS | HEART RATE: 57 BPM | RESPIRATION RATE: 20 BRPM | DIASTOLIC BLOOD PRESSURE: 70 MMHG | OXYGEN SATURATION: 97 %

## 2023-11-27 DIAGNOSIS — I10 ESSENTIAL HYPERTENSION: Chronic | ICD-10-CM

## 2023-11-27 DIAGNOSIS — M25.521 BILATERAL ELBOW JOINT PAIN: Chronic | ICD-10-CM

## 2023-11-27 DIAGNOSIS — Z00.00 ROUTINE GENERAL MEDICAL EXAMINATION AT A HEALTH CARE FACILITY: Primary | ICD-10-CM

## 2023-11-27 DIAGNOSIS — Z79.899 ENCOUNTER FOR LONG-TERM (CURRENT) USE OF OTHER MEDICATIONS: ICD-10-CM

## 2023-11-27 DIAGNOSIS — M25.522 BILATERAL ELBOW JOINT PAIN: Chronic | ICD-10-CM

## 2023-11-27 DIAGNOSIS — G89.29 CHRONIC PAIN OF LEFT KNEE: Chronic | ICD-10-CM

## 2023-11-27 DIAGNOSIS — M25.562 CHRONIC PAIN OF LEFT KNEE: Chronic | ICD-10-CM

## 2023-11-27 DIAGNOSIS — Z12.5 PROSTATE CANCER SCREENING: ICD-10-CM

## 2023-11-27 DIAGNOSIS — E78.00 HYPERCHOLESTEREMIA: Chronic | ICD-10-CM

## 2023-11-27 DIAGNOSIS — J31.0 CHRONIC RHINITIS: Chronic | ICD-10-CM

## 2023-11-27 DIAGNOSIS — Z13.220 SCREENING FOR LIPOID DISORDERS: ICD-10-CM

## 2023-11-27 DIAGNOSIS — E66.9 OBESITY, CLASS I, BMI 30-34.9: Chronic | ICD-10-CM

## 2023-11-27 DIAGNOSIS — Z13.1 SCREENING FOR DIABETES MELLITUS: ICD-10-CM

## 2023-11-27 LAB
ALBUMIN SERPL BCP-MCNC: 3.8 G/DL (ref 3.5–5)
ALBUMIN/GLOB SERPL: 1.3 {RATIO}
ALP SERPL-CCNC: 78 U/L (ref 45–115)
ALT SERPL W P-5'-P-CCNC: 42 U/L (ref 16–61)
ANION GAP SERPL CALCULATED.3IONS-SCNC: 9 MMOL/L (ref 7–16)
AST SERPL W P-5'-P-CCNC: 22 U/L (ref 15–37)
BASOPHILS # BLD AUTO: 0.06 K/UL (ref 0–0.2)
BASOPHILS NFR BLD AUTO: 0.8 % (ref 0–1)
BILIRUB SERPL-MCNC: 0.7 MG/DL (ref ?–1.2)
BUN SERPL-MCNC: 13 MG/DL (ref 7–18)
BUN/CREAT SERPL: 15 (ref 6–20)
CALCIUM SERPL-MCNC: 8.8 MG/DL (ref 8.5–10.1)
CHLORIDE SERPL-SCNC: 109 MMOL/L (ref 98–107)
CHOLEST SERPL-MCNC: 119 MG/DL (ref 0–200)
CHOLEST/HDLC SERPL: 2.5 {RATIO}
CO2 SERPL-SCNC: 26 MMOL/L (ref 21–32)
CREAT SERPL-MCNC: 0.87 MG/DL (ref 0.7–1.3)
DIFFERENTIAL METHOD BLD: ABNORMAL
EGFR (NO RACE VARIABLE) (RUSH/TITUS): 96 ML/MIN/1.73M2
EOSINOPHIL # BLD AUTO: 0.14 K/UL (ref 0–0.5)
EOSINOPHIL NFR BLD AUTO: 2 % (ref 1–4)
ERYTHROCYTE [DISTWIDTH] IN BLOOD BY AUTOMATED COUNT: 12.1 % (ref 11.5–14.5)
EST. AVERAGE GLUCOSE BLD GHB EST-MCNC: 114 MG/DL
GLOBULIN SER-MCNC: 3 G/DL (ref 2–4)
GLUCOSE SERPL-MCNC: 108 MG/DL (ref 74–106)
HBA1C MFR BLD HPLC: 5.6 % (ref 4.5–6.6)
HCT VFR BLD AUTO: 39.6 % (ref 40–54)
HDLC SERPL-MCNC: 47 MG/DL (ref 40–60)
HGB BLD-MCNC: 14 G/DL (ref 13.5–18)
IMM GRANULOCYTES # BLD AUTO: 0.06 K/UL (ref 0–0.04)
IMM GRANULOCYTES NFR BLD: 0.8 % (ref 0–0.4)
LDLC SERPL CALC-MCNC: 47 MG/DL
LDLC/HDLC SERPL: 1 {RATIO}
LYMPHOCYTES # BLD AUTO: 2.45 K/UL (ref 1–4.8)
LYMPHOCYTES NFR BLD AUTO: 34.4 % (ref 27–41)
MCH RBC QN AUTO: 32 PG (ref 27–31)
MCHC RBC AUTO-ENTMCNC: 35.4 G/DL (ref 32–36)
MCV RBC AUTO: 90.6 FL (ref 80–96)
MONOCYTES # BLD AUTO: 0.53 K/UL (ref 0–0.8)
MONOCYTES NFR BLD AUTO: 7.4 % (ref 2–6)
MPC BLD CALC-MCNC: 11.2 FL (ref 9.4–12.4)
NEUTROPHILS # BLD AUTO: 3.88 K/UL (ref 1.8–7.7)
NEUTROPHILS NFR BLD AUTO: 54.6 % (ref 53–65)
NONHDLC SERPL-MCNC: 72 MG/DL
NRBC # BLD AUTO: 0 X10E3/UL
NRBC, AUTO (.00): 0 %
PLATELET # BLD AUTO: 224 K/UL (ref 150–400)
POTASSIUM SERPL-SCNC: 4.1 MMOL/L (ref 3.5–5.1)
PROT SERPL-MCNC: 6.8 G/DL (ref 6.4–8.2)
PSA SERPL-MCNC: 1.26 NG/ML
RBC # BLD AUTO: 4.37 M/UL (ref 4.6–6.2)
SODIUM SERPL-SCNC: 140 MMOL/L (ref 136–145)
TRIGL SERPL-MCNC: 126 MG/DL (ref 35–150)
TSH SERPL DL<=0.005 MIU/L-ACNC: 1.26 UIU/ML (ref 0.36–3.74)
VLDLC SERPL-MCNC: 25 MG/DL
WBC # BLD AUTO: 7.12 K/UL (ref 4.5–11)

## 2023-11-27 PROCEDURE — 80050 GENERAL HEALTH PANEL: CPT | Mod: ,,, | Performed by: CLINICAL MEDICAL LABORATORY

## 2023-11-27 PROCEDURE — 3078F PR MOST RECENT DIASTOLIC BLOOD PRESSURE < 80 MM HG: ICD-10-PCS | Mod: ,,, | Performed by: NURSE PRACTITIONER

## 2023-11-27 PROCEDURE — 99396 PR PREVENTIVE VISIT,EST,40-64: ICD-10-PCS | Mod: ,,, | Performed by: NURSE PRACTITIONER

## 2023-11-27 PROCEDURE — 1160F RVW MEDS BY RX/DR IN RCRD: CPT | Mod: ,,, | Performed by: NURSE PRACTITIONER

## 2023-11-27 PROCEDURE — 4010F PR ACE/ARB THEARPY RXD/TAKEN: ICD-10-PCS | Mod: ,,, | Performed by: NURSE PRACTITIONER

## 2023-11-27 PROCEDURE — 83036 HEMOGLOBIN A1C: ICD-10-PCS | Mod: ,,, | Performed by: CLINICAL MEDICAL LABORATORY

## 2023-11-27 PROCEDURE — 3008F PR BODY MASS INDEX (BMI) DOCUMENTED: ICD-10-PCS | Mod: ,,, | Performed by: NURSE PRACTITIONER

## 2023-11-27 PROCEDURE — 1159F PR MEDICATION LIST DOCUMENTED IN MEDICAL RECORD: ICD-10-PCS | Mod: ,,, | Performed by: NURSE PRACTITIONER

## 2023-11-27 PROCEDURE — 4010F ACE/ARB THERAPY RXD/TAKEN: CPT | Mod: ,,, | Performed by: NURSE PRACTITIONER

## 2023-11-27 PROCEDURE — 3078F DIAST BP <80 MM HG: CPT | Mod: ,,, | Performed by: NURSE PRACTITIONER

## 2023-11-27 PROCEDURE — 3044F HG A1C LEVEL LT 7.0%: CPT | Mod: ,,, | Performed by: NURSE PRACTITIONER

## 2023-11-27 PROCEDURE — 99396 PREV VISIT EST AGE 40-64: CPT | Mod: ,,, | Performed by: NURSE PRACTITIONER

## 2023-11-27 PROCEDURE — 1159F MED LIST DOCD IN RCRD: CPT | Mod: ,,, | Performed by: NURSE PRACTITIONER

## 2023-11-27 PROCEDURE — G0103 PSA, SCREENING: ICD-10-PCS | Mod: ,,, | Performed by: CLINICAL MEDICAL LABORATORY

## 2023-11-27 PROCEDURE — 80061 LIPID PANEL: CPT | Mod: ,,, | Performed by: CLINICAL MEDICAL LABORATORY

## 2023-11-27 PROCEDURE — 80061 LIPID PANEL: ICD-10-PCS | Mod: ,,, | Performed by: CLINICAL MEDICAL LABORATORY

## 2023-11-27 PROCEDURE — 3008F BODY MASS INDEX DOCD: CPT | Mod: ,,, | Performed by: NURSE PRACTITIONER

## 2023-11-27 PROCEDURE — 1160F PR REVIEW ALL MEDS BY PRESCRIBER/CLIN PHARMACIST DOCUMENTED: ICD-10-PCS | Mod: ,,, | Performed by: NURSE PRACTITIONER

## 2023-11-27 PROCEDURE — 83036 HEMOGLOBIN GLYCOSYLATED A1C: CPT | Mod: ,,, | Performed by: CLINICAL MEDICAL LABORATORY

## 2023-11-27 PROCEDURE — G0103 PSA SCREENING: HCPCS | Mod: ,,, | Performed by: CLINICAL MEDICAL LABORATORY

## 2023-11-27 PROCEDURE — 80050 PR  GENERAL HEALTH PANEL: ICD-10-PCS | Mod: ,,, | Performed by: CLINICAL MEDICAL LABORATORY

## 2023-11-27 PROCEDURE — 3044F PR MOST RECENT HEMOGLOBIN A1C LEVEL <7.0%: ICD-10-PCS | Mod: ,,, | Performed by: NURSE PRACTITIONER

## 2023-11-27 PROCEDURE — 3074F PR MOST RECENT SYSTOLIC BLOOD PRESSURE < 130 MM HG: ICD-10-PCS | Mod: ,,, | Performed by: NURSE PRACTITIONER

## 2023-11-27 PROCEDURE — 3074F SYST BP LT 130 MM HG: CPT | Mod: ,,, | Performed by: NURSE PRACTITIONER

## 2023-11-27 RX ORDER — MELOXICAM 7.5 MG/1
7.5 TABLET ORAL DAILY PRN
Qty: 90 TABLET | Refills: 3 | Status: SHIPPED | OUTPATIENT
Start: 2023-11-27

## 2023-11-27 RX ORDER — MV-MIN/FOLIC/K1/LYCOPEN/LUTEIN 300-60 MCG
300 TABLET ORAL DAILY
COMMUNITY

## 2023-11-27 RX ORDER — PREDNISOLONE ACETATE 10 MG/ML
1 SUSPENSION/ DROPS OPHTHALMIC 2 TIMES DAILY
COMMUNITY
Start: 2023-11-07

## 2023-11-27 RX ORDER — FLUTICASONE PROPIONATE 50 MCG
2 SPRAY, SUSPENSION (ML) NASAL DAILY
Qty: 48 G | Refills: 3 | Status: SHIPPED | OUTPATIENT
Start: 2023-11-27

## 2023-11-27 RX ORDER — CYCLOBENZAPRINE HCL 10 MG
10 TABLET ORAL 3 TIMES DAILY PRN
Qty: 30 TABLET | Refills: 1 | Status: SHIPPED | OUTPATIENT
Start: 2023-11-27

## 2023-11-27 RX ORDER — OFLOXACIN 3 MG/ML
1 SOLUTION/ DROPS OPHTHALMIC 2 TIMES DAILY
COMMUNITY
Start: 2023-11-07

## 2023-11-27 NOTE — PROGRESS NOTES
[facility] - FAMILY MEDICINE       PATIENT NAME: Avtar Clements   : 1960    AGE: 63 y.o. DATE OF ENCOUNTER: 23    MRN: [id number]      Subjective     Patient ID: Avtar Clements is a 63 y.o. male.    Chief Complaint: Healthy You (Patient presents to the clinic for a healthy you/Would like flexeril. )    HPI  Presents for healthy wellness visit and yearly f/u.   Had lost weight, but regained and is 15 lbs heavier than last year.   Had flu shot at [facility].    Meloxicam prn joint pain. Requests prn rx of flexeril to have on hand for occasional back spasms.    Has yearly f/u with Cardiology, Dr. Chiang, for HTN & HLD, on statin.  Last visit late Sept, had echo.    Review of Systems   Constitutional: Negative.    HENT:  Positive for nasal congestion (chronitis). Negative for ear pain and sore throat.    Respiratory: Negative.     Cardiovascular: Negative.    Gastrointestinal: Negative.    Genitourinary: Negative.    Musculoskeletal:  Positive for arthralgias.   Neurological: Negative.    Psychiatric/Behavioral: Negative.         Tobacco Use: Low Risk  (2023)    Patient History     Smoking Tobacco Use: Never     Smokeless Tobacco Use: Never     Passive Exposure: Not on file     Review of patient's allergies indicates:  No Known Allergies  Current Outpatient Medications   Medication Instructions    amLODIPine (NORVASC) 5 mg, Oral, Daily    atorvastatin (LIPITOR) 40 mg, Oral, Daily    cyclobenzaprine (FLEXERIL) 10 mg, Oral, 3 times daily PRN    fluticasone propionate (FLONASE) 100 mcg, Each Nostril, Daily, As needed    lisinopriL (PRINIVIL,ZESTRIL) 40 mg, Oral, Daily    meloxicam (MOBIC) 7.5 mg, Oral, Daily PRN    mv-min-folic-K1-lycopen-lutein (CENTRUM SILVER ULTRA MEN'S) 374--300 mcg Tab 300 mg, Oral, Daily    ofloxacin (OCUFLOX) 0.3 % ophthalmic solution 1 drop, Left Eye, 2 times daily    prednisoLONE acetate (PRED FORTE) 1 % DrpS 1 drop, Left Eye, 2 times daily     Medications  Discontinued During This Encounter   Medication Reason    fluticasone propionate (FLONASE) 50 mcg/actuation nasal spray Reorder    meloxicam (MOBIC) 7.5 MG tablet Reorder    azithromycin (Z-KENDRA) 250 MG tablet Therapy completed    nebivoloL (BYSTOLIC) 5 MG Tab Patient no longer taking       Past Medical History:   Diagnosis Date    COVID-19 6/16/2022    Essential hypertension     Hypercholesteremia 11/05/2020         Health Maintenance Topics with due status: Not Due       Topic Last Completion Date    Colorectal Cancer Screening 08/10/2015    TETANUS VACCINE 04/13/2021    PROSTATE-SPECIFIC ANTIGEN 11/27/2023    Hemoglobin A1c (Diabetic Prevention Screening) 11/27/2023    Lipid Panel 11/27/2023     Immunization History   Administered Date(s) Administered    COVID-19, vector-nr, rS-Ad26, PF (Mickie) 05/03/2021    Influenza 09/15/2020    Influenza - Quadrivalent - PF *Preferred* (6 months and older) 11/09/2021    Tdap 04/13/2021    Zoster Recombinant 10/07/2021, 11/01/2022       Objective     Body mass index is 34.94 kg/m².  Wt Readings from Last 3 Encounters:   11/27/23 116.8 kg (257 lb 9.6 oz)   03/06/23 114.1 kg (251 lb 9.6 oz)   11/15/22 109.8 kg (242 lb)     Ht Readings from Last 3 Encounters:   11/27/23 6' (1.829 m)   03/06/23 6' (1.829 m)   11/15/22 6' (1.829 m)     BP Readings from Last 3 Encounters:   11/27/23 115/70   03/06/23 116/60   11/15/22 126/72     Temp Readings from Last 3 Encounters:   11/27/23 97.6 °F (36.4 °C) (Oral)   03/06/23 97.9 °F (36.6 °C) (Oral)   11/15/22 98.5 °F (36.9 °C) (Oral)     Pulse Readings from Last 3 Encounters:   11/27/23 (!) 57   03/06/23 60   11/15/22 60     Resp Readings from Last 3 Encounters:   11/27/23 20   03/06/23 18   11/15/22 18     PF Readings from Last 3 Encounters:   No data found for PF       Physical Exam  Vitals and nursing note reviewed.   Constitutional:       General: He is not in acute distress.     Appearance: Normal appearance.   HENT:      Head:  Normocephalic.      Right Ear: Tympanic membrane, ear canal and external ear normal.      Left Ear: Tympanic membrane, ear canal and external ear normal.      Nose: Nose normal.      Mouth/Throat:      Mouth: Mucous membranes are moist.      Pharynx: Oropharynx is clear.   Eyes:      Conjunctiva/sclera: Conjunctivae normal.      Pupils: Pupils are equal, round, and reactive to light.   Neck:      Thyroid: No thyromegaly.      Vascular: Normal carotid pulses. No carotid bruit.   Cardiovascular:      Rate and Rhythm: Normal rate and regular rhythm.      Pulses: Normal pulses.      Heart sounds: Normal heart sounds.   Pulmonary:      Effort: Pulmonary effort is normal. No respiratory distress.      Breath sounds: Normal breath sounds.   Abdominal:      Palpations: Abdomen is soft.      Tenderness: There is no abdominal tenderness.   Musculoskeletal:      Cervical back: Neck supple.      Right lower leg: No edema.      Left lower leg: No edema.   Lymphadenopathy:      Cervical: No cervical adenopathy.   Skin:     General: Skin is warm and dry.   Neurological:      General: No focal deficit present.      Mental Status: He is alert and oriented to person, place, and time.   Psychiatric:         Mood and Affect: Mood normal.         Behavior: Behavior normal.         Assessment and Plan     1. Routine general medical examination at a health care facility    2. Screening for diabetes mellitus  -     Hemoglobin A1C; Future; Expected date: 11/27/2023    3. Screening for lipoid disorders  -     Lipid Panel; Future; Expected date: 11/27/2023    4. Essential hypertension  -     Comprehensive Metabolic Panel; Future; Expected date: 11/27/2023    5. Hypercholesteremia  -     Comprehensive Metabolic Panel; Future; Expected date: 11/27/2023    6. Encounter for long-term (current) use of other medications  -     CBC Auto Differential; Future; Expected date: 11/27/2023  -     Comprehensive Metabolic Panel; Future; Expected date:  11/27/2023  -     TSH; Future; Expected date: 11/27/2023  -     Hemoglobin A1C; Future; Expected date: 11/27/2023    7. Prostate cancer screening  -     PSA, Screening; Future; Expected date: 11/27/2023    8. Obesity, Class I, BMI 30-34.9  -     Hemoglobin A1C; Future; Expected date: 11/27/2023    9. Chronic rhinitis  -     fluticasone propionate (FLONASE) 50 mcg/actuation nasal spray; 2 sprays (100 mcg total) by Each Nostril route once daily. As needed  Dispense: 48 g; Refill: 3    10. Chronic pain of left knee  -     meloxicam (MOBIC) 7.5 MG tablet; Take 1 tablet (7.5 mg total) by mouth daily as needed for Pain.  Dispense: 90 tablet; Refill: 3    11. Bilateral elbow joint pain  Comments:  mild, intermittent; has stopped golfing so not bothering him as much  Orders:  -     meloxicam (MOBIC) 7.5 MG tablet; Take 1 tablet (7.5 mg total) by mouth daily as needed for Pain.  Dispense: 90 tablet; Refill: 3    Other orders  -     cyclobenzaprine (FLEXERIL) 10 MG tablet; Take 1 tablet (10 mg total) by mouth 3 (three) times daily as needed for Muscle spasms.  Dispense: 30 tablet; Refill: 1       Plan: HY screening labs plus other needed labs for long term monitoring  Med refills as requested  Complete & return wellness form when lab results are in.      I have reviewed the medications, allergies, and problem list.     Goal Actions:    What type of visit is the patient here for today?: Healthy You  Does the patient consent to enroll in [facility] Healthy?: Yes  Is this a Wellness Follow Up?: No  What is your overall wellness goal? (select at least one): Lose weight, Improve overall health  Choose 3: Biometric, Lifestyle, Nutrition  Biometric Actions: BMI>30 lose 1-2 lbs per week  Lifestyle Actions : Take medications as prescribed  Nurtrition Actions: Recommend weight loss, drink 8-10 glasses of water per day, Eat heart healthy diet    F/u HY 1 yr with fasting labs; f/u as needed.    Colleen Maldonado [facility]

## 2023-11-28 ENCOUNTER — PATIENT OUTREACH (OUTPATIENT)
Dept: ADMINISTRATIVE | Facility: HOSPITAL | Age: 63
End: 2023-11-28

## 2023-11-28 NOTE — PROGRESS NOTES
Population Health Chart Review & Patient Outreach Details      Further Action Needed If Patient Returns Outreach:            Updates Requested / Reviewed:     []  Care Everywhere    []     []  External Sources ([facility], [facility], [facility], etc.)    [] [facility]   [] [facility]   [] Other:    []  Care Team Updated   []  Removed  or Duplicate Orders   []  Immunization Reconciliation Completed / Queried    [] [place]   [] [place]   [] [place]   [] [place]      Health Maintenance Topics Addressed and Outreach Outcomes / Actions Taken:             Breast Cancer Screening []  Mammogram Order Placed    []  Mammogram Screening Scheduled    []  External Records Requested & Care Team Updated if Applicable    []  External Records Uploaded & Care Team Updated if Applicable    []  Pt Declined Scheduling Mammogram    []  Pt Will Schedule with External Provider / Order Routed & Care Team Updated if Applicable              Cervical Cancer Screening []  Pap Smear Scheduled in Primary Care or OBGYN    []  External Records Requested & Care Team Updated if Applicable       []  External Records Uploaded, Care Team Updated, & History Updated if Applicable    []  Patient Declined Scheduling Pap Smear    []  Patient Will Schedule with External Provider & Care Team Updated if Applicable                  Colorectal Cancer Screening []  Colonoscopy Case Request / Referral / Home Test Order Placed    []  External Records Requested & Care Team Updated if Applicable    []  External Records Uploaded, Care Team Updated, & History Updated if Applicable    []  Patient Declined Completing Colon Cancer Screening    []  Patient Will Schedule with External Provider & Care Team Updated if Applicable    []  Fit Kit Mailed (add the SmartPhrase under additional notes)    []  Reminded Patient to Complete Home Test                Diabetic Eye Exam []  Eye Exam Screening Order Placed    []  Eye Camera Scheduled or Optometry/Ophthalmology Referral  Placed    []  External Records Requested & Care Team Updated if Applicable    []  External Records Uploaded, Care Team Updated, & History Updated if Applicable    []  Patient Declined Scheduling Eye Exam    []  Patient Will Schedule with External Provider & Care Team Updated if Applicable             Blood Pressure Control []  Primary Care Follow Up Visit Scheduled     []  Remote Blood Pressure Reading Captured    []  Patient Declined Remote Reading or Scheduling Appt - Escalated to PCP    []  Patient Will Call Back or Send Portal Message with Reading                 HbA1c & Other Labs []  Overdue Lab(s) Ordered    []  Overdue Lab(s) Scheduled    []  External Records Uploaded & Care Team Updated if Applicable    []  Primary Care Follow Up Visit Scheduled     []  Reminded Patient to Complete A1c Home Test    []  Patient Declined Scheduling Labs or Will Call Back to Schedule    []  Patient Will Schedule with External Provider / Order Routed, & Care Team Updated if Applicable           Primary Care Appointment []  Primary Care Appt Scheduled    []  Patient Declined Scheduling or Will Call Back to Schedule    []  Pt Established with External Provider, Updated Care Team, & Informed Pt to Notify Payor if Applicable           Medication Adherence /    Statin Use []  Primary Care Appointment Scheduled    []  Patient Reminded to  Prescription    []  Patient Declined, Provider Notified if Needed    []  Sent Provider Message to Review to Evaluate Pt for Statin, Add Exclusion Dx Codes, Document   Exclusion in Problem List, Change Statin Intensity Level to Moderate or High Intensity if Applicable                Osteoporosis Screening []  Dexa Order Placed    []  Dexa Appointment Scheduled    []  External Records Requested & Care Team Updated    []  External Records Uploaded, Care Team Updated, & History Updated if Applicable    []  Patient Declined Scheduling Dexa or Will Call Back to Schedule    []  Patient Will Schedule  with External Provider / Order Routed & Care Team Updated if Applicable       Additional Notes:.  Post visit Population Health review of encounter with date of service  11/27/23 with Florentin.  All required HY components in encounter.    Followup appt for: 11/6/2024 HY

## 2023-11-29 NOTE — PROGRESS NOTES
Call pt and review results.  Lipids well controlled with atorvastatin.  Very mild decrease in RBCs but do not have any previous labs to compare to in Epic.  PSA normal.  No acute concerning lab findings.  Send a copy of his lab results to his cardiologist, Dr. Chiang.

## 2024-11-06 ENCOUNTER — OFFICE VISIT (OUTPATIENT)
Dept: FAMILY MEDICINE | Facility: CLINIC | Age: 64
End: 2024-11-06
Payer: COMMERCIAL

## 2024-11-06 VITALS
DIASTOLIC BLOOD PRESSURE: 78 MMHG | OXYGEN SATURATION: 95 % | WEIGHT: 247.19 LBS | HEIGHT: 72 IN | BODY MASS INDEX: 33.48 KG/M2 | RESPIRATION RATE: 18 BRPM | HEART RATE: 58 BPM | TEMPERATURE: 98 F | SYSTOLIC BLOOD PRESSURE: 115 MMHG

## 2024-11-06 DIAGNOSIS — Z13.220 SCREENING FOR HYPERLIPIDEMIA: ICD-10-CM

## 2024-11-06 DIAGNOSIS — Z23 FLU VACCINE NEED: ICD-10-CM

## 2024-11-06 DIAGNOSIS — Z13.1 DIABETES MELLITUS SCREENING: ICD-10-CM

## 2024-11-06 DIAGNOSIS — M62.838 MUSCLE SPASM: ICD-10-CM

## 2024-11-06 DIAGNOSIS — M25.522 BILATERAL ELBOW JOINT PAIN: Chronic | ICD-10-CM

## 2024-11-06 DIAGNOSIS — Z12.5 PROSTATE CANCER SCREENING: ICD-10-CM

## 2024-11-06 DIAGNOSIS — M25.562 CHRONIC PAIN OF LEFT KNEE: Chronic | ICD-10-CM

## 2024-11-06 DIAGNOSIS — I10 ESSENTIAL HYPERTENSION: Chronic | ICD-10-CM

## 2024-11-06 DIAGNOSIS — E78.00 HYPERCHOLESTEREMIA: Chronic | ICD-10-CM

## 2024-11-06 DIAGNOSIS — Z00.00 ROUTINE GENERAL MEDICAL EXAMINATION AT A HEALTH CARE FACILITY: Primary | ICD-10-CM

## 2024-11-06 DIAGNOSIS — G89.29 CHRONIC PAIN OF LEFT KNEE: Chronic | ICD-10-CM

## 2024-11-06 DIAGNOSIS — Z79.899 OTHER LONG TERM (CURRENT) DRUG THERAPY: ICD-10-CM

## 2024-11-06 DIAGNOSIS — J31.0 CHRONIC RHINITIS: Chronic | ICD-10-CM

## 2024-11-06 DIAGNOSIS — M25.521 BILATERAL ELBOW JOINT PAIN: Chronic | ICD-10-CM

## 2024-11-06 LAB
ALBUMIN SERPL BCP-MCNC: 3.6 G/DL (ref 3.5–5)
ALBUMIN/GLOB SERPL: 1.1 {RATIO}
ALP SERPL-CCNC: 69 U/L (ref 45–115)
ALT SERPL W P-5'-P-CCNC: 45 U/L (ref 16–61)
ANION GAP SERPL CALCULATED.3IONS-SCNC: 11 MMOL/L (ref 7–16)
AST SERPL W P-5'-P-CCNC: 18 U/L (ref 15–37)
BASOPHILS # BLD AUTO: 0.06 K/UL (ref 0–0.2)
BASOPHILS NFR BLD AUTO: 0.9 % (ref 0–1)
BILIRUB SERPL-MCNC: 0.6 MG/DL (ref ?–1.2)
BUN SERPL-MCNC: 12 MG/DL (ref 7–18)
BUN/CREAT SERPL: 14 (ref 6–20)
CALCIUM SERPL-MCNC: 8.7 MG/DL (ref 8.5–10.1)
CHLORIDE SERPL-SCNC: 108 MMOL/L (ref 98–107)
CHOLEST SERPL-MCNC: 88 MG/DL (ref 0–200)
CHOLEST/HDLC SERPL: 1.8 {RATIO}
CO2 SERPL-SCNC: 25 MMOL/L (ref 21–32)
CREAT SERPL-MCNC: 0.84 MG/DL (ref 0.7–1.3)
DIFFERENTIAL METHOD BLD: ABNORMAL
EGFR (NO RACE VARIABLE) (RUSH/TITUS): 97 ML/MIN/1.73M2
EOSINOPHIL # BLD AUTO: 0.14 K/UL (ref 0–0.5)
EOSINOPHIL NFR BLD AUTO: 2.2 % (ref 1–4)
ERYTHROCYTE [DISTWIDTH] IN BLOOD BY AUTOMATED COUNT: 12.2 % (ref 11.5–14.5)
GLOBULIN SER-MCNC: 3.3 G/DL (ref 2–4)
GLUCOSE SERPL-MCNC: 99 MG/DL (ref 74–106)
HCT VFR BLD AUTO: 39.5 % (ref 40–54)
HDLC SERPL-MCNC: 49 MG/DL (ref 40–60)
HGB BLD-MCNC: 13.2 G/DL (ref 13.5–18)
IMM GRANULOCYTES # BLD AUTO: 0.07 K/UL (ref 0–0.04)
IMM GRANULOCYTES NFR BLD: 1.1 % (ref 0–0.4)
LDLC SERPL CALC-MCNC: 20 MG/DL
LYMPHOCYTES # BLD AUTO: 2.37 K/UL (ref 1–4.8)
LYMPHOCYTES NFR BLD AUTO: 36.4 % (ref 27–41)
MCH RBC QN AUTO: 30.9 PG (ref 27–31)
MCHC RBC AUTO-ENTMCNC: 33.4 G/DL (ref 32–36)
MCV RBC AUTO: 92.5 FL (ref 80–96)
MONOCYTES # BLD AUTO: 0.59 K/UL (ref 0–0.8)
MONOCYTES NFR BLD AUTO: 9.1 % (ref 2–6)
MPC BLD CALC-MCNC: 10.6 FL (ref 9.4–12.4)
NEUTROPHILS # BLD AUTO: 3.28 K/UL (ref 1.8–7.7)
NEUTROPHILS NFR BLD AUTO: 50.3 % (ref 53–65)
NONHDLC SERPL-MCNC: 39 MG/DL
NRBC # BLD AUTO: 0 X10E3/UL
NRBC, AUTO (.00): 0 %
PLATELET # BLD AUTO: 216 K/UL (ref 150–400)
POTASSIUM SERPL-SCNC: 4.2 MMOL/L (ref 3.5–5.1)
PROT SERPL-MCNC: 6.9 G/DL (ref 6.4–8.2)
PSA SERPL-MCNC: 1.11 NG/ML
RBC # BLD AUTO: 4.27 M/UL (ref 4.6–6.2)
SODIUM SERPL-SCNC: 140 MMOL/L (ref 136–145)
TRIGL SERPL-MCNC: 96 MG/DL (ref 35–150)
VLDLC SERPL-MCNC: 19 MG/DL
WBC # BLD AUTO: 6.51 K/UL (ref 4.5–11)

## 2024-11-06 PROCEDURE — 90471 IMMUNIZATION ADMIN: CPT | Mod: ,,, | Performed by: NURSE PRACTITIONER

## 2024-11-06 PROCEDURE — 1101F PT FALLS ASSESS-DOCD LE1/YR: CPT | Mod: ,,, | Performed by: NURSE PRACTITIONER

## 2024-11-06 PROCEDURE — 85025 COMPLETE CBC W/AUTO DIFF WBC: CPT | Mod: ,,, | Performed by: CLINICAL MEDICAL LABORATORY

## 2024-11-06 PROCEDURE — 3008F BODY MASS INDEX DOCD: CPT | Mod: ,,, | Performed by: NURSE PRACTITIONER

## 2024-11-06 PROCEDURE — 1160F RVW MEDS BY RX/DR IN RCRD: CPT | Mod: ,,, | Performed by: NURSE PRACTITIONER

## 2024-11-06 PROCEDURE — 90653 IIV ADJUVANT VACCINE IM: CPT | Mod: ,,, | Performed by: NURSE PRACTITIONER

## 2024-11-06 PROCEDURE — 3078F DIAST BP <80 MM HG: CPT | Mod: ,,, | Performed by: NURSE PRACTITIONER

## 2024-11-06 PROCEDURE — G0103 PSA SCREENING: HCPCS | Mod: ,,, | Performed by: CLINICAL MEDICAL LABORATORY

## 2024-11-06 PROCEDURE — 3288F FALL RISK ASSESSMENT DOCD: CPT | Mod: ,,, | Performed by: NURSE PRACTITIONER

## 2024-11-06 PROCEDURE — 4010F ACE/ARB THERAPY RXD/TAKEN: CPT | Mod: ,,, | Performed by: NURSE PRACTITIONER

## 2024-11-06 PROCEDURE — 80061 LIPID PANEL: CPT | Mod: ,,, | Performed by: CLINICAL MEDICAL LABORATORY

## 2024-11-06 PROCEDURE — 99397 PER PM REEVAL EST PAT 65+ YR: CPT | Mod: 25,,, | Performed by: NURSE PRACTITIONER

## 2024-11-06 PROCEDURE — 1159F MED LIST DOCD IN RCRD: CPT | Mod: ,,, | Performed by: NURSE PRACTITIONER

## 2024-11-06 PROCEDURE — 3074F SYST BP LT 130 MM HG: CPT | Mod: ,,, | Performed by: NURSE PRACTITIONER

## 2024-11-06 PROCEDURE — 80053 COMPREHEN METABOLIC PANEL: CPT | Mod: ,,, | Performed by: CLINICAL MEDICAL LABORATORY

## 2024-11-06 RX ORDER — AMLODIPINE BESYLATE 5 MG/1
5 TABLET ORAL DAILY
Qty: 90 TABLET | Refills: 3 | Status: SHIPPED | OUTPATIENT
Start: 2024-11-06

## 2024-11-06 RX ORDER — MELOXICAM 7.5 MG/1
7.5 TABLET ORAL DAILY PRN
Qty: 90 TABLET | Refills: 3 | Status: SHIPPED | OUTPATIENT
Start: 2024-11-06

## 2024-11-06 RX ORDER — FLUTICASONE PROPIONATE 50 MCG
2 SPRAY, SUSPENSION (ML) NASAL DAILY
Qty: 48 G | Refills: 3 | Status: SHIPPED | OUTPATIENT
Start: 2024-11-06

## 2024-11-06 RX ORDER — CYCLOBENZAPRINE HCL 10 MG
10 TABLET ORAL 3 TIMES DAILY PRN
Qty: 30 TABLET | Refills: 2 | Status: SHIPPED | OUTPATIENT
Start: 2024-11-06

## 2024-11-06 NOTE — ASSESSMENT & PLAN NOTE
Lab Results   Component Value Date    LDLCALC 47 11/27/2023     Well controlled.    Continue atorvastatin 40 mg daily.    Check lipids today.

## 2024-11-06 NOTE — ASSESSMENT & PLAN NOTE
Well controlled, followed by cardiology, Dr. Chiang, yearly.  Continue amlodipine 5 mg daily and lisinopril 40 mg daily.

## 2024-11-06 NOTE — PROGRESS NOTES
[facility]  [address] [place] [address]  Phone: [phone]  Fax: [phone]     PATIENT NAME: Avtar Clements   : 1960    AGE: 64 y.o. DATE OF ENCOUNTER: 24    Provider:    MRN: [id number]      Subjective     Patient ID: Avtar Clements is a 64 y.o. male.    Chief Complaint: Healthy You (Patient reports to the clinic for [employer] Healthy You, he is fasting this morning.) and Health Maintenance (Care gaps addressed, patient would like his Flu vaccine today.//Sandi Brown CMA)    HPI  Healthy you (note on schedule: pt wanted to get it early and said he would pay for it if need to be).  Has a form to be completed for wellness.    Review of Systems   Constitutional: Negative.    HENT:  Positive for nasal congestion (chronitis). Negative for ear pain and sore throat.    Respiratory: Negative.     Cardiovascular: Negative.    Gastrointestinal: Negative.    Genitourinary: Negative.    Musculoskeletal:  Positive for arthralgias.   Neurological: Negative.    Psychiatric/Behavioral: Negative.         Tobacco Use: Low Risk  (2024)    Patient History     Smoking Tobacco Use: Never     Smokeless Tobacco Use: Never     Passive Exposure: Not on file     Review of patient's allergies indicates:  No Known Allergies  Current Outpatient Medications   Medication Instructions    amLODIPine (NORVASC) 5 mg, Oral, Daily    atorvastatin (LIPITOR) 40 mg, Daily    cyclobenzaprine (FLEXERIL) 10 mg, Oral, 3 times daily PRN    fluticasone propionate (FLONASE) 100 mcg, Each Nostril, Daily, As needed    lisinopriL (PRINIVIL,ZESTRIL) 40 mg, Daily    meloxicam (MOBIC) 7.5 mg, Oral, Daily PRN    mv-min-folic-K1-lycopen-lutein (CENTRUM SILVER ULTRA MEN'S) 116--300 mcg Tab 300 mg, Daily    ofloxacin (OCUFLOX) 0.3 % ophthalmic solution 1 drop, 2 times daily    prednisoLONE acetate (PRED FORTE) 1 % DrpS 1 drop, 2 times daily     Medications Discontinued During This Encounter    Medication Reason    amLODIPine (NORVASC) 5 MG tablet Reorder    fluticasone propionate (FLONASE) 50 mcg/actuation nasal spray Reorder    meloxicam (MOBIC) 7.5 MG tablet Reorder    cyclobenzaprine (FLEXERIL) 10 MG tablet Reorder       Past Medical History:   Diagnosis Date    COVID-19 6/16/2022    Essential hypertension     Hypercholesteremia 11/05/2020         Health Maintenance Topics with due status: Not Due       Topic Last Completion Date    Colorectal Cancer Screening 08/10/2015    TETANUS VACCINE 04/13/2021    PROSTATE-SPECIFIC ANTIGEN 11/27/2023    Hemoglobin A1c (Diabetic Prevention Screening) 11/27/2023    Lipid Panel 11/27/2023    High Dose Statin 11/06/2024     Immunization History   Administered Date(s) Administered    COVID-19, vector-nr, rS-Ad26, PF (Mickie) 05/03/2021    Influenza 09/15/2020    Influenza - Quadrivalent - PF *Preferred* (6 months and older) 11/09/2021    Influenza - Trivalent - Fluad - Adjuvanted - PF (65 years and older 11/06/2024    Tdap 04/13/2021    Zoster Recombinant 10/07/2021, 11/01/2022       Objective     Body mass index is 33.53 kg/m².  Wt Readings from Last 3 Encounters:   11/06/24 112.1 kg (247 lb 3.2 oz)   11/27/23 116.8 kg (257 lb 9.6 oz)   03/06/23 114.1 kg (251 lb 9.6 oz)     Ht Readings from Last 3 Encounters:   11/06/24 6' (1.829 m)   11/27/23 6' (1.829 m)   03/06/23 6' (1.829 m)     BP Readings from Last 3 Encounters:   11/06/24 115/78   11/27/23 115/70   03/06/23 116/60     Temp Readings from Last 3 Encounters:   11/06/24 97.8 °F (36.6 °C) (Oral)   11/27/23 97.6 °F (36.4 °C) (Oral)   03/06/23 97.9 °F (36.6 °C) (Oral)     Pulse Readings from Last 3 Encounters:   11/06/24 (!) 58   11/27/23 (!) 57   03/06/23 60     Resp Readings from Last 3 Encounters:   11/06/24 18   11/27/23 20   03/06/23 18     PF Readings from Last 3 Encounters:   No data found for PF       Physical Exam  Vitals and nursing note reviewed.   Constitutional:       General: He is not in  acute distress.     Appearance: Normal appearance. He is not ill-appearing.   HENT:      Head: Normocephalic.      Right Ear: Tympanic membrane, ear canal and external ear normal.      Left Ear: Tympanic membrane, ear canal and external ear normal.      Nose: Nose normal.      Mouth/Throat:      Mouth: Mucous membranes are moist.      Pharynx: Oropharynx is clear. Uvula midline. No posterior oropharyngeal erythema or uvula swelling.   Eyes:      Conjunctiva/sclera: Conjunctivae normal.      Pupils: Pupils are equal, round, and reactive to light.   Neck:      Thyroid: No thyromegaly.      Vascular: Normal carotid pulses. No carotid bruit.   Cardiovascular:      Rate and Rhythm: Normal rate and regular rhythm.      Pulses: Normal pulses.      Heart sounds: Normal heart sounds.   Pulmonary:      Effort: Pulmonary effort is normal. No respiratory distress.      Breath sounds: Normal breath sounds. No wheezing, rhonchi or rales.   Abdominal:      Palpations: Abdomen is soft.      Tenderness: There is no abdominal tenderness.   Musculoskeletal:      Cervical back: Neck supple.      Right lower leg: No edema.      Left lower leg: No edema.   Lymphadenopathy:      Cervical: No cervical adenopathy.   Skin:     General: Skin is warm and dry.      Coloration: Skin is not jaundiced or pale.   Neurological:      General: No focal deficit present.      Mental Status: He is alert and oriented to person, place, and time.      Gait: Gait normal.   Psychiatric:         Mood and Affect: Mood normal.         Behavior: Behavior normal.         Assessment and Plan     1. Routine general medical examination at a health care facility    2. Essential hypertension  Assessment & Plan:  Well controlled, followed by cardiology, Dr. Chiang, yearly.  Continue amlodipine 5 mg daily and lisinopril 40 mg daily.    Orders:  -     amLODIPine (NORVASC) 5 MG tablet; Take 1 tablet (5 mg total) by mouth once daily.  Dispense: 90 tablet; Refill: 3    3.  Hypercholesteremia  Assessment & Plan:  Lab Results   Component Value Date    LDLCALC 47 11/27/2023     Well controlled.    Continue atorvastatin 40 mg daily.    Check lipids today.      4. Prostate cancer screening  -     PSA, Screening; Future; Expected date: 11/06/2024    5. Other long term (current) drug therapy  -     CBC Auto Differential; Future; Expected date: 11/06/2024  -     Comprehensive Metabolic Panel; Future; Expected date: 11/06/2024    6. Diabetes mellitus screening  -     Comprehensive Metabolic Panel; Future; Expected date: 11/06/2024    7. Screening for hyperlipidemia  -     Lipid Panel; Future; Expected date: 11/06/2024    8. Flu vaccine need  -     influenza (adjuvanted) (Fluad) 45 mcg/0.5 mL IM vaccine (> or = 66 yo) 0.5 mL    9. Chronic rhinitis  -     fluticasone propionate (FLONASE) 50 mcg/actuation nasal spray; 2 sprays (100 mcg total) by Each Nostril route once daily. As needed  Dispense: 48 g; Refill: 3    10. Chronic pain of left knee  -     meloxicam (MOBIC) 7.5 MG tablet; Take 1 tablet (7.5 mg total) by mouth daily as needed for Pain.  Dispense: 90 tablet; Refill: 3    11. Bilateral elbow joint pain  -     meloxicam (MOBIC) 7.5 MG tablet; Take 1 tablet (7.5 mg total) by mouth daily as needed for Pain.  Dispense: 90 tablet; Refill: 3    12. Muscle spasm  -     cyclobenzaprine (FLEXERIL) 10 MG tablet; Take 1 tablet (10 mg total) by mouth 3 (three) times daily as needed for Muscle spasms.  Dispense: 30 tablet; Refill: 2         Plan:   Screening lab(s) for Healthy You plus other labs needed for long term monitoring, work-up, and/or to address care gaps.  Additional treatment plan as above in Assessment and Plan.  Schedule 1 year f/u Healthy You if not already prescheduled plus any additional follow-up as indicated.  Will come back in 2 wks for shot only - Prevnar-20.      I have reviewed the medications, allergies, and problem list.     Goal Actions:    What type of visit is the patient  here for today?: Healthy You  Does the patient consent to enroll in [employer] Me Healthy?: No  Is this a Wellness Follow Up?: No  What is your overall wellness goal? (select at least one): Improve overall health, Lose weight  Choose 3: Biometric, Nutrition, Lifestyle  Biometric Actions: Attend regularly scheduled office visits, BMI>30 lose 1-2 lbs per week  Lifestyle Actions : Take medications as prescribed  Nurtrition Actions: Eat heart healthy diet, drink 8-10 glasses of water per day      Follow up for Healthy You 1 yr with fasting labs, and follow-up as needed.    Signature:  YUE Hayward-BC    Future Appointments   Date Time Provider Department Center   11/20/2025  7:20 AM Colleen Maldonado FNP [facility]

## 2024-11-11 DIAGNOSIS — D64.9 MILD ANEMIA: Primary | ICD-10-CM

## 2024-11-11 DIAGNOSIS — Z79.899 OTHER LONG TERM (CURRENT) DRUG THERAPY: ICD-10-CM

## 2025-08-07 ENCOUNTER — PATIENT OUTREACH (OUTPATIENT)
Facility: HOSPITAL | Age: 65
End: 2025-08-07
Payer: COMMERCIAL